# Patient Record
Sex: MALE | Race: WHITE | NOT HISPANIC OR LATINO | URBAN - METROPOLITAN AREA
[De-identification: names, ages, dates, MRNs, and addresses within clinical notes are randomized per-mention and may not be internally consistent; named-entity substitution may affect disease eponyms.]

---

## 2023-03-08 ENCOUNTER — TELEPHONE (OUTPATIENT)
Dept: PRIMARY CARE | Facility: CLINIC | Age: 68
End: 2023-03-08
Payer: MEDICARE

## 2023-03-08 NOTE — TELEPHONE ENCOUNTER
Pt left message saying he has COVID last week and was given paxlovid which he completed and it helped him. He was feeling better. But he started to not feel well again and He tested yesterday and it was positive. He has the same sx just they're not as intense. He would like to know what he should do if anything or if he could even do a virtual with you.     [facility] on file    Contact [phone]

## 2023-06-28 DIAGNOSIS — G47.00 INSOMNIA, UNSPECIFIED: ICD-10-CM

## 2023-06-28 RX ORDER — TRAZODONE HYDROCHLORIDE 100 MG/1
TABLET ORAL
Qty: 135 TABLET | Refills: 1 | Status: SHIPPED | OUTPATIENT
Start: 2023-06-28 | End: 2023-12-27

## 2023-07-06 LAB
ALANINE AMINOTRANSFERASE (SGPT) (U/L) IN SER/PLAS: 30 U/L (ref 10–52)
ANION GAP IN SER/PLAS: 13 MMOL/L (ref 10–20)
CALCIUM (MG/DL) IN SER/PLAS: 9.9 MG/DL (ref 8.6–10.6)
CARBON DIOXIDE, TOTAL (MMOL/L) IN SER/PLAS: 27 MMOL/L (ref 21–32)
CHLORIDE (MMOL/L) IN SER/PLAS: 102 MMOL/L (ref 98–107)
CHOLESTEROL (MG/DL) IN SER/PLAS: 168 MG/DL (ref 0–199)
CHOLESTEROL IN HDL (MG/DL) IN SER/PLAS: 58.6 MG/DL
CHOLESTEROL/HDL RATIO: 2.9
CREATININE (MG/DL) IN SER/PLAS: 0.81 MG/DL (ref 0.5–1.3)
ESTIMATED AVERAGE GLUCOSE FOR HBA1C: 111 MG/DL
GFR MALE: >90 ML/MIN/1.73M2
GLUCOSE (MG/DL) IN SER/PLAS: 97 MG/DL (ref 74–99)
HEMOGLOBIN A1C/HEMOGLOBIN TOTAL IN BLOOD: 5.5 %
LDL: 94 MG/DL (ref 0–99)
POTASSIUM (MMOL/L) IN SER/PLAS: 4.7 MMOL/L (ref 3.5–5.3)
SODIUM (MMOL/L) IN SER/PLAS: 137 MMOL/L (ref 136–145)
TRIGLYCERIDE (MG/DL) IN SER/PLAS: 76 MG/DL (ref 0–149)
UREA NITROGEN (MG/DL) IN SER/PLAS: 13 MG/DL (ref 6–23)
VLDL: 15 MG/DL (ref 0–40)

## 2023-07-08 DIAGNOSIS — E78.00 PURE HYPERCHOLESTEROLEMIA, UNSPECIFIED: ICD-10-CM

## 2023-07-10 RX ORDER — ATORVASTATIN CALCIUM 20 MG/1
TABLET, FILM COATED ORAL
Qty: 90 TABLET | Refills: 1 | Status: SHIPPED | OUTPATIENT
Start: 2023-07-10 | End: 2024-01-02

## 2023-07-17 PROBLEM — R73.09 HEMOGLOBIN A1C LESS THAN 7.0%: Status: ACTIVE | Noted: 2023-07-17

## 2023-07-17 PROBLEM — R73.01 ELEVATED FASTING GLUCOSE: Status: ACTIVE | Noted: 2023-07-17

## 2023-07-17 PROBLEM — E55.9 VITAMIN D DEFICIENCY: Status: ACTIVE | Noted: 2023-07-17
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
## 2023-07-17 PROBLEM — D12.6 TUBULOVILLOUS ADENOMA OF COLON: Status: ACTIVE | Noted: 2023-07-17

## 2023-07-17 PROBLEM — R93.1 AGATSTON CAC SCORE 100-199: Status: ACTIVE | Noted: 2023-07-17

## 2023-07-17 PROBLEM — R55 SYNCOPE: Status: ACTIVE | Noted: 2023-07-17

## 2023-07-17 PROBLEM — L98.9 SKIN LESION: Status: ACTIVE | Noted: 2023-07-17

## 2023-07-17 PROBLEM — F40.10 SHY BLADDER SYNDROME: Status: ACTIVE | Noted: 2023-07-17

## 2023-07-17 PROBLEM — E78.00 HYPERCHOLESTEROLEMIA: Status: ACTIVE | Noted: 2023-07-17

## 2023-07-17 PROBLEM — G47.00 INSOMNIA, UNSPECIFIED: Status: ACTIVE | Noted: 2023-07-17

## 2023-07-17 PROBLEM — K21.9 GERD (GASTROESOPHAGEAL REFLUX DISEASE): Status: ACTIVE | Noted: 2023-07-17

## 2023-07-17 PROBLEM — R09.81 NASAL CONGESTION: Status: ACTIVE | Noted: 2023-07-17

## 2023-07-17 PROBLEM — R35.1 NOCTURIA: Status: ACTIVE | Noted: 2023-07-17

## 2023-07-17 PROBLEM — R06.83 SNORING: Status: ACTIVE | Noted: 2023-07-17

## 2023-07-17 PROBLEM — T78.40XA ALLERGIC DRUG REACTION: Status: ACTIVE | Noted: 2023-07-17

## 2023-07-17 PROBLEM — R73.03 PREDIABETES: Status: ACTIVE | Noted: 2023-07-17

## 2023-07-17 PROBLEM — N40.0 BPH (BENIGN PROSTATIC HYPERPLASIA): Status: ACTIVE | Noted: 2023-07-17

## 2023-07-17 PROBLEM — R40.0 DAYTIME SOMNOLENCE: Status: ACTIVE | Noted: 2023-07-17

## 2023-07-17 RX ORDER — ACETAMINOPHEN 500 MG
TABLET ORAL
COMMUNITY
Start: 2014-08-18

## 2023-07-17 RX ORDER — TADALAFIL 20 MG/1
TABLET ORAL
COMMUNITY
Start: 2016-06-22

## 2023-07-17 RX ORDER — FINASTERIDE 5 MG/1
5 TABLET, FILM COATED ORAL DAILY
COMMUNITY

## 2023-07-17 RX ORDER — TAMSULOSIN HYDROCHLORIDE 0.4 MG/1
0.4 CAPSULE ORAL 2 TIMES DAILY
COMMUNITY

## 2023-07-18 ENCOUNTER — OFFICE VISIT (OUTPATIENT)
Dept: PRIMARY CARE | Facility: CLINIC | Age: 68
End: 2023-07-18
Payer: MEDICARE

## 2023-07-18 VITALS
SYSTOLIC BLOOD PRESSURE: 128 MMHG | WEIGHT: 185 LBS | BODY MASS INDEX: 27.4 KG/M2 | HEART RATE: 74 BPM | OXYGEN SATURATION: 95 % | HEIGHT: 69 IN | DIASTOLIC BLOOD PRESSURE: 72 MMHG

## 2023-07-18 DIAGNOSIS — G47.00 INSOMNIA, UNSPECIFIED TYPE: ICD-10-CM

## 2023-07-18 DIAGNOSIS — E78.00 HYPERCHOLESTEROLEMIA: Primary | ICD-10-CM

## 2023-07-18 DIAGNOSIS — R73.03 PREDIABETES: ICD-10-CM

## 2023-07-18 DIAGNOSIS — R35.1 BENIGN PROSTATIC HYPERPLASIA WITH NOCTURIA: ICD-10-CM

## 2023-07-18 DIAGNOSIS — N40.1 BENIGN PROSTATIC HYPERPLASIA WITH NOCTURIA: ICD-10-CM

## 2023-07-18 PROBLEM — R73.01 ELEVATED FASTING GLUCOSE: Status: RESOLVED | Noted: 2023-07-17 | Resolved: 2023-07-18

## 2023-07-18 PROBLEM — R73.09 HEMOGLOBIN A1C LESS THAN 7.0%: Status: RESOLVED | Noted: 2023-07-17 | Resolved: 2023-07-18
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
## 2023-07-18 PROCEDURE — 99214 OFFICE O/P EST MOD 30 MIN: CPT | Performed by: INTERNAL MEDICINE

## 2023-07-18 PROCEDURE — 1159F MED LIST DOCD IN RCRD: CPT | Performed by: INTERNAL MEDICINE

## 2023-07-18 PROCEDURE — 1160F RVW MEDS BY RX/DR IN RCRD: CPT | Performed by: INTERNAL MEDICINE

## 2023-07-18 RX ORDER — MULTIVITAMIN
1 TABLET ORAL DAILY
COMMUNITY

## 2023-07-18 RX ORDER — ASPIRIN 81 MG/1
81 TABLET ORAL DAILY
COMMUNITY

## 2023-07-18 ASSESSMENT — ENCOUNTER SYMPTOMS
DIZZINESS: 0
FATIGUE: 0
SHORTNESS OF BREATH: 0
HEADACHES: 0
MYALGIAS: 0
INSOMNIA: 1

## 2023-07-18 NOTE — PROGRESS NOTES
"Subjective   Patient ID: Kei Carlton is a 68 y.o. male who presents for Follow-up chronic medical problems.    Doing well.  Sleeping 6-7 hours with trazodone and melatonin.  Nocturia x 3.  Urology following, proscar and flomax.  Scheduled for colonoscopy this fall.  Has derm fu for skin survey.  Meds and labs reviewed.    Hyperlipidemia  This is a chronic problem. The current episode started more than 1 year ago. The problem is controlled. Recent lipid tests were reviewed and are low. Pertinent negatives include no chest pain, myalgias or shortness of breath. Current antihyperlipidemic treatment includes statins. The current treatment provides significant improvement of lipids. There are no compliance problems.    Insomnia  This is a chronic problem. The current episode started more than 1 year ago. The problem occurs rarely. The problem has been resolved. Pertinent negatives include no chest pain, fatigue, headaches or myalgias. The treatment provided significant relief.        Review of Systems   Constitutional:  Negative for fatigue.   Respiratory:  Negative for shortness of breath.    Cardiovascular:  Negative for chest pain.   Musculoskeletal:  Negative for myalgias.   Neurological:  Negative for dizziness and headaches.   Psychiatric/Behavioral:  The patient has insomnia.        Objective   /72 (BP Location: Right arm, Patient Position: Sitting)   Pulse 74   Ht 1.753 m (5' 9\")   Wt 83.9 kg (185 lb)   SpO2 95%   BMI 27.32 kg/m²     Physical Exam  Constitutional:       Appearance: Normal appearance.   Cardiovascular:      Rate and Rhythm: Normal rate and regular rhythm.      Pulses: Normal pulses.      Heart sounds: Normal heart sounds.   Pulmonary:      Effort: Pulmonary effort is normal.      Breath sounds: Normal breath sounds.   Abdominal:      General: Abdomen is flat. Bowel sounds are normal.      Palpations: Abdomen is soft.   Neurological:      General: No focal deficit present.      Mental " Status: He is alert.   Psychiatric:         Mood and Affect: Mood normal.         Behavior: Behavior normal.         Thought Content: Thought content normal.         Judgment: Judgment normal.       Assessment/Plan   Problem List Items Addressed This Visit          Cardiac and Vasculature    Hypercholesterolemia - Primary     Lipids, LDL at goal with statin.  Recommend low fat/cholesterol diet.           Relevant Orders    CBC and Auto Differential    Comprehensive metabolic panel    Lipid Panel    TSH with reflex to Free T4 if abnormal       Endocrine/Metabolic    Prediabetes     A1c normal with diet.         Relevant Orders    CBC and Auto Differential    Comprehensive metabolic panel    Hemoglobin A1C       Genitourinary and Reproductive    BPH (benign prostatic hyperplasia)     Urology following, on proscar and flomax.            Sleep    Insomnia, unspecified     Sleep improved with trazodone and melatonin.

## 2023-08-16 ASSESSMENT — ENCOUNTER SYMPTOMS
HEADACHES: 0
DYSURIA: 0
LEG PAIN: 0
TINGLING: 0
FEVER: 0
PARESTHESIAS: 0
BOWEL INCONTINENCE: 0
WEIGHT LOSS: 0
NUMBNESS: 0
PERIANAL NUMBNESS: 0
PARESIS: 0
WEAKNESS: 0
ABDOMINAL PAIN: 0
BACK PAIN: 1

## 2023-08-17 ENCOUNTER — OFFICE VISIT (OUTPATIENT)
Dept: PRIMARY CARE | Facility: CLINIC | Age: 68
End: 2023-08-17
Payer: MEDICARE

## 2023-08-17 VITALS
HEIGHT: 69 IN | HEART RATE: 74 BPM | DIASTOLIC BLOOD PRESSURE: 70 MMHG | SYSTOLIC BLOOD PRESSURE: 116 MMHG | OXYGEN SATURATION: 98 % | BODY MASS INDEX: 27.55 KG/M2 | WEIGHT: 186 LBS
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
## 2023-08-17 DIAGNOSIS — M53.3 COCCYX PAIN: Primary | ICD-10-CM

## 2023-08-17 PROBLEM — N40.1 HYPERTROPHY OF PROSTATE WITH URINARY OBSTRUCTION: Status: ACTIVE | Noted: 2017-10-31

## 2023-08-17 PROBLEM — N13.8 HYPERTROPHY OF PROSTATE WITH URINARY OBSTRUCTION: Status: ACTIVE | Noted: 2017-10-31

## 2023-08-17 PROBLEM — N52.9 ERECTILE DYSFUNCTION: Status: ACTIVE | Noted: 2017-10-31

## 2023-08-17 PROBLEM — N27.0 UNILATERAL SMALL KIDNEY: Status: ACTIVE | Noted: 2017-10-31

## 2023-08-17 PROCEDURE — 1160F RVW MEDS BY RX/DR IN RCRD: CPT | Performed by: NURSE PRACTITIONER

## 2023-08-17 PROCEDURE — 1159F MED LIST DOCD IN RCRD: CPT | Performed by: NURSE PRACTITIONER

## 2023-08-17 PROCEDURE — 1125F AMNT PAIN NOTED PAIN PRSNT: CPT | Performed by: NURSE PRACTITIONER

## 2023-08-17 PROCEDURE — 99212 OFFICE O/P EST SF 10 MIN: CPT | Performed by: NURSE PRACTITIONER

## 2023-08-17 ASSESSMENT — ENCOUNTER SYMPTOMS
PARESIS: 0
NUMBNESS: 0
DYSURIA: 0
BACK PAIN: 1
ABDOMINAL PAIN: 0
PARESTHESIAS: 0
WEAKNESS: 0
HEADACHES: 0
TINGLING: 0
PERIANAL NUMBNESS: 0
BOWEL INCONTINENCE: 0
BRUISES/BLEEDS EASILY: 0
RESPIRATORY NEGATIVE: 1
FEVER: 0
WEIGHT LOSS: 0
LEG PAIN: 0

## 2023-08-17 ASSESSMENT — PAIN SCALES - GENERAL: PAINLEVEL: 6

## 2023-08-17 NOTE — PROGRESS NOTES
Subjective   Patient ID: Kei Carlton is a 68 y.o. male who presents for Fall (Fell and hit tailbone. He was trimming up shrubs and fell backwards into a bush landing on his tail bone. /LOV 7/18/23/NOV 1/10/24).    Patient here for acute visit, Last seen by Dr. Hinton on 07/18/2023  Current Concerns:  1) Fell 2 weeks ago while trimming trees, standing on the ground- fell backwards and into a bush landing on his tail bone with the bigger branch hitting on the tail bone area.  Today reports pain is 6/10.  Using Advil 400 mg- relieves pain very well.  Initially 1-2 tabs every six hours. Reports has only one kidney and worried that taking Advil will be harmful. Denies any change in bowel or bladder. Hurts to sit, standing walking and lying down not painful. Heat or ice not helpful   Is planning on driving to [place] in few weeks- concerned about pain with sitting in car although thus far driving does not seem to be a problem.  Chronic concerns; BPH, GERD, Insomnia, Hypercholesterolemia, Prediabetes, ED, OA left hip    Back Pain  This is a new problem. The current episode started 1 to 4 weeks ago. The problem occurs constantly. The problem is unchanged. The pain is present in the gluteal. The quality of the pain is described as aching. The pain does not radiate. The pain is at a severity of 7/10. The pain is The same all the time. The symptoms are aggravated by sitting. Pertinent negatives include no abdominal pain, bladder incontinence, bowel incontinence, chest pain, dysuria, fever, headaches, leg pain, numbness, paresis, paresthesias, pelvic pain, perianal numbness, tingling, weakness or weight loss. Risk factors include recent trauma.        Review of Systems   Constitutional:  Negative for fever and weight loss.   Respiratory: Negative.     Cardiovascular:  Negative for chest pain.   Gastrointestinal:  Negative for abdominal pain and bowel incontinence.   Genitourinary:  Negative for bladder incontinence,  "dysuria and pelvic pain.   Musculoskeletal:  Positive for back pain.        No weakness in extremities.    Skin: Negative.    Neurological:  Negative for tingling, weakness, numbness, headaches and paresthesias.   Hematological:  Does not bruise/bleed easily.       Objective   /70 (BP Location: Left arm, Patient Position: Sitting, BP Cuff Size: Adult)   Pulse 74   Ht 1.753 m (5' 9\")   Wt 84.4 kg (186 lb)   SpO2 98%   BMI 27.47 kg/m²     Physical Exam  Vitals reviewed.   Constitutional:       Appearance: Normal appearance.   Cardiovascular:      Rate and Rhythm: Normal rate and regular rhythm.      Heart sounds: Normal heart sounds.   Pulmonary:      Effort: Pulmonary effort is normal.      Breath sounds: Normal breath sounds.   Musculoskeletal:         General: Normal range of motion.      Cervical back: Normal.      Thoracic back: Normal.      Lumbar back: Normal.        Back:       Comments: Area of pain to palpation- no swelling or bruising noted.    Skin:     General: Skin is warm.   Neurological:      Mental Status: He is alert.   Psychiatric:         Mood and Affect: Mood normal.         Behavior: Behavior normal.         Judgment: Judgment normal.       Assessment/Plan   Diagnoses and all orders for this visit:  Coccyx pain / Fall, initial encounter- continue on Advil only as absolutely needed. Declined prednisone. Conservative treatment with heat ice and pillow -donut shape for comfort.    -     XR sacrum coccyx 2+ views; Future  PLAN: follow up as scheduled with Dr. Hinton   X-ray-> communicate results.    "

## 2023-08-17 NOTE — PATIENT INSTRUCTIONS
Use pillow or donut shaped pillow while sitting  X-ray ordered, will communicate results through My Chart  Will communicate Dr. Mary Jane Schneider vaccine recommendations through My Chart.

## 2023-08-17 NOTE — PROGRESS NOTES
Answers submitted by the patient for this visit:  Back Pain Questionnaire (Submitted on 8/16/2023)  Chief Complaint: Back pain  Chronicity: new  Onset: 1 to 4 weeks ago  Frequency: constantly  Progression since onset: unchanged  Pain location: gluteal  Pain quality: aching  Radiates to: does not radiate  Pain - numeric: 7/10  Pain is: the same all the time  Aggravated by: sitting  abdominal pain: No  bladder incontinence: No  bowel incontinence: No  chest pain: No  dysuria: No  fever: No  headaches: No  leg pain: No  numbness: No  paresis: No  paresthesias: No  pelvic pain: No  perianal numbness: No  tingling: No  weakness: No  weight loss: No  Risk factors: recent trauma     Dupixent Counseling: I discussed with the patient the risks of dupilumab including but not limited to eye infection and irritation, cold sores, injection site reactions, worsening of asthma, allergic reactions and increased risk of parasitic infection.  Live vaccines should be avoided while taking dupilumab. Dupilumab will also interact with certain medications such as warfarin and cyclosporine. The patient understands that monitoring is required and they must alert us or the primary physician if symptoms of infection or other concerning signs are noted.

## 2023-10-03 ENCOUNTER — OFFICE VISIT (OUTPATIENT)
Dept: DERMATOLOGY | Facility: CLINIC | Age: 68
End: 2023-10-03
Payer: MEDICARE

## 2023-10-03 DIAGNOSIS — Z12.83 SKIN CANCER SCREENING: ICD-10-CM

## 2023-10-03 DIAGNOSIS — L81.4 LENTIGO: ICD-10-CM

## 2023-10-03 DIAGNOSIS — D18.01 HEMANGIOMA OF SKIN: ICD-10-CM

## 2023-10-03 DIAGNOSIS — D22.9 NEVUS: Primary | ICD-10-CM

## 2023-10-03 DIAGNOSIS — L82.1 SEBORRHEIC KERATOSIS: ICD-10-CM

## 2023-10-03 DIAGNOSIS — L73.8 SEBACEOUS HYPERPLASIA OF FACE: ICD-10-CM

## 2023-10-03 PROCEDURE — 99213 OFFICE O/P EST LOW 20 MIN: CPT | Performed by: NURSE PRACTITIONER

## 2023-10-03 PROCEDURE — 1159F MED LIST DOCD IN RCRD: CPT | Performed by: NURSE PRACTITIONER

## 2023-10-03 PROCEDURE — 1160F RVW MEDS BY RX/DR IN RCRD: CPT | Performed by: NURSE PRACTITIONER

## 2023-10-03 PROCEDURE — 1125F AMNT PAIN NOTED PAIN PRSNT: CPT | Performed by: NURSE PRACTITIONER

## 2023-10-03 ASSESSMENT — DERMATOLOGY QUALITY OF LIFE (QOL) ASSESSMENT
WHAT SINGLE SKIN CONDITION LISTED BELOW IS THE PATIENT ANSWERING THE QUALITY-OF-LIFE ASSESSMENT QUESTIONS ABOUT: NONE OF THE ABOVE
RATE HOW EMOTIONALLY BOTHERED YOU ARE BY YOUR SKIN PROBLEM (FOR EXAMPLE, WORRY, EMBARRASSMENT, FRUSTRATION): 0 - NEVER BOTHERED
RATE HOW BOTHERED YOU ARE BY SYMPTOMS OF YOUR SKIN PROBLEM (EG, ITCHING, STINGING BURNING, HURTING OR SKIN IRRITATION): 0 - NEVER BOTHERED
RATE HOW BOTHERED YOU ARE BY EFFECTS OF YOUR SKIN PROBLEMS ON YOUR ACTIVITIES (EG, GOING OUT, ACCOMPLISHING WHAT YOU WANT, WORK ACTIVITIES OR YOUR RELATIONSHIPS WITH OTHERS): 0 - NEVER BOTHERED
ARE THERE EXCLUSIONS OR EXCEPTIONS FOR THE QUALITY OF LIFE ASSESSMENT: NO
DATE THE QUALITY-OF-LIFE ASSESSMENT WAS COMPLETED: 66750

## 2023-10-03 ASSESSMENT — DERMATOLOGY PATIENT ASSESSMENT
HAVE YOU HAD OR DO YOU HAVE VASCULAR DISEASE: NO
DO YOU USE A TANNING BED: NO
DO YOU HAVE ANY NEW OR CHANGING LESIONS: NO
ARE YOU AN ORGAN TRANSPLANT RECIPIENT: NO
HAVE YOU HAD OR DO YOU HAVE A STAPH INFECTION: NO
FOR PATIENTS COMING IN FOR A FOLLOW-UP VISIT - HAVE THERE BEEN ANY CHANGES IN YOUR HEALTH SINCE YOUR LAST VISIT: NONE.

## 2023-10-03 ASSESSMENT — PATIENT GLOBAL ASSESSMENT (PGA): PATIENT GLOBAL ASSESSMENT: PATIENT GLOBAL ASSESSMENT:  1 - CLEAR

## 2023-10-03 ASSESSMENT — ITCH NUMERIC RATING SCALE: HOW SEVERE IS YOUR ITCHING?: 0

## 2023-10-03 NOTE — PROGRESS NOTES
Subjective     Kei Carlton is a 68 y.o. male who presents for the following: Skin Check (Patient presents to office today for FBSE. PMHX insignificant. Denies lesions that are painful, itching or bleeding. Denies further complaints. ).     Review of Systems:  No other skin or systemic complaints other than what is documented elsewhere in the note.    The following portions of the chart were reviewed this encounter and updated as appropriate:          Skin Cancer History  No skin cancer on file.      Specialty Problems          Dermatology Problems    Skin lesion        Objective   Well appearing patient in no apparent distress; mood and affect are within normal limits.    A focused skin examination was performed. All findings within normal limits unless otherwise noted below.    Assessment/Plan   1. Nevus    Related Procedures  Follow Up In Dermatology - Established Patient    2. Skin cancer screening    3. Hemangioma of skin  Scattered cherry-red papule(s).    - A cherry hemangioma is a small macule (small, flat, smooth area) or papule (small, solid bump) formed from an overgrowth of tiny blood vessels in the skin. Cherry hemangiomas are characteristically red or purplish in color. They often first appear in middle adulthood and usually increase in number with age. Cherry hemangiomas are noncancerous (benign) and are common in adults.  - Try to avoid trauma to the lesion(s), which may cause irritation and bleeding of the area.  - If cosmetically bothersome or if they cause irritation or bleeding, cherry hemangiomas may be removed by cutting away the area (excision), burning away the area (electrocautery), freezing the area (cryosurgery), or via laser therapy.      4. Lentigo  Scattered tan macules in sun-exposed areas.    - A solar lentigo (plural, solar lentigines), also known as a sun-induced freckle or senile lentigo, is a dark (hyperpigmented) lesion caused by natural or artificial ultraviolet (UV) light.  Solar lentigines may be single or multiple.   - Solar lentigines typically appear on areas exposed to natural or artificial UV light. They appear as well-defined, light brown to black, flat spots.   - Solar lentigines are benign, but they do indicate excessive sun exposure, a risk factor for the development of skin cancer.      5. Sebaceous hyperplasia of face  Small yellow, lobulated papules with a central dell.    Sebaceous hyperplasia  - This is a common harmless enlargement of the skin oil glands.  - No treatment is required. Unless cosmetically removed, these lesions will not resolve.       6. Seborrheic keratosis  Stuck on verrucous, tan-brown papules and plaques.      Although Seborrheic Keratoses can be troublesome and unsightly, they are entirely benign.  Removal of Seborrheic Keratoses is considered a cosmetic procedure. Removal is typically performed using liquid nitrogen cryotherapy.  Treatment of current lesions does not prevent the development of new Seborrheic Keratoses in the future.

## 2023-12-23 DIAGNOSIS — G47.00 INSOMNIA, UNSPECIFIED: ICD-10-CM

## 2023-12-27 RX ORDER — TRAZODONE HYDROCHLORIDE 100 MG/1
TABLET ORAL
Qty: 135 TABLET | Refills: 1 | Status: SHIPPED | OUTPATIENT
Start: 2023-12-27

## 2023-12-31 DIAGNOSIS — E78.00 PURE HYPERCHOLESTEROLEMIA, UNSPECIFIED: ICD-10-CM

## 2024-01-01 NOTE — PROGRESS NOTES
Subjective   HPI: Kei Carlton is a 68 y.o. male is a new patient here for evaluation and treatment multiple 1 mm hyperkeratotic papules on the sole of the right foot that have been present for about 3 weeks.  They are not pruritic however they are painful if he puts his weight on these areas of his foot.  He has not had anything like this in the past.  He has not been anywhere barefoot.  Patient also mentions that he has a small red papule on his penis that is erythematous.  He states that he mention this to his urologist last visit she recommended Neosporin to be put on this area.  The area does not itch or hurt it is just simply red and there.    ROS: No other skin or systemic complaints other than what is documented elsewhere in the note.    ALLERGIES: Clindamycin    SOCIAL:  reports that he has been smoking cigars. He has never used smokeless tobacco. No history on file for alcohol use and drug use.    Objective   Right Hallux Metatarsal Plantar Area, Right Medial Plantar Surface  Multiple 1 mm yellow hyperkeratotic papules on weight baring area of right foot      Patient declines genital examination at this time.    Assessment/Plan   1. Punctate palmoplantar keratoderma (2)  Right Hallux Metatarsal Plantar Area; Right Medial Plantar Surface    Start:  Amlactin every day  Augmented betamethasone every day     Discussed benign nature of punctate keratosis with patient.  I brought in Dr. Sears during this visit and he recommended doing a shave removal to get a diagnosis and then recommended AmLactin and augmented betamethasone cream.    I would like to see patient back in 1 week to discuss biopsy results.    Shave removal - Right Hallux Metatarsal Plantar Area    Specimen 1 - Dermatopathology- DERM LAB  Differential Diagnosis: punctate hyperkeratosis vs cutaneous corn  Check Margins Yes/No?:    Comments:    Dermpath Lab: Routine Histopathology (formalin-fixed tissue)    Related Procedures  Follow Up In  Dermatology - Established Patient    Related Medications  betamethasone, augmented, (Diprolene AF) 0.05 % cream  Apply topically once daily. Apply a thin layer to affected area once daily. DO NOT USE ON FACE OR GROIN.    ammonium lactate (Amlactin) 12 % cream  Apply topically daily to dry skin.         I recommended that patient apply Polysporin or bacitracin to the area on his penis for the next 5 days.  If this does not improve then at next visit I recommended I take a look.  Patient agrees with this plan.    FOLLOW UP: 1 week-biopsy results and groin region discussion    The patient was encouraged to contact me with any further questions or concerns.  Genoveva Faust PA-C  1/18/2024

## 2024-01-02 ENCOUNTER — LAB (OUTPATIENT)
Dept: LAB | Facility: LAB | Age: 69
End: 2024-01-02
Payer: MEDICARE

## 2024-01-02 DIAGNOSIS — R73.03 PREDIABETES: ICD-10-CM

## 2024-01-02 DIAGNOSIS — E78.00 HYPERCHOLESTEROLEMIA: ICD-10-CM

## 2024-01-02 LAB
ALBUMIN SERPL BCP-MCNC: 4.6 G/DL (ref 3.4–5)
ALP SERPL-CCNC: 71 U/L (ref 33–136)
ALT SERPL W P-5'-P-CCNC: 29 U/L (ref 10–52)
ANION GAP SERPL CALC-SCNC: 12 MMOL/L (ref 10–20)
AST SERPL W P-5'-P-CCNC: 21 U/L (ref 9–39)
BASOPHILS # BLD AUTO: 0.05 X10*3/UL (ref 0–0.1)
BASOPHILS NFR BLD AUTO: 0.7 %
BILIRUB SERPL-MCNC: 0.7 MG/DL (ref 0–1.2)
BUN SERPL-MCNC: 12 MG/DL (ref 6–23)
CALCIUM SERPL-MCNC: 9.6 MG/DL (ref 8.6–10.6)
CHLORIDE SERPL-SCNC: 103 MMOL/L (ref 98–107)
CHOLEST SERPL-MCNC: 161 MG/DL (ref 0–199)
CHOLESTEROL/HDL RATIO: 3.1
CO2 SERPL-SCNC: 27 MMOL/L (ref 21–32)
CREAT SERPL-MCNC: 0.86 MG/DL (ref 0.5–1.3)
EOSINOPHIL # BLD AUTO: 0.14 X10*3/UL (ref 0–0.7)
EOSINOPHIL NFR BLD AUTO: 2 %
ERYTHROCYTE [DISTWIDTH] IN BLOOD BY AUTOMATED COUNT: 12.8 % (ref 11.5–14.5)
EST. AVERAGE GLUCOSE BLD GHB EST-MCNC: 114 MG/DL
GFR SERPL CREATININE-BSD FRML MDRD: >90 ML/MIN/1.73M*2
GLUCOSE SERPL-MCNC: 99 MG/DL (ref 74–99)
HBA1C MFR BLD: 5.6 %
HCT VFR BLD AUTO: 47.2 % (ref 41–52)
HDLC SERPL-MCNC: 52.5 MG/DL
HGB BLD-MCNC: 15.2 G/DL (ref 13.5–17.5)
IMM GRANULOCYTES # BLD AUTO: 0.03 X10*3/UL (ref 0–0.7)
IMM GRANULOCYTES NFR BLD AUTO: 0.4 % (ref 0–0.9)
LDLC SERPL CALC-MCNC: 90 MG/DL
LYMPHOCYTES # BLD AUTO: 1.5 X10*3/UL (ref 1.2–4.8)
LYMPHOCYTES NFR BLD AUTO: 21.8 %
MCH RBC QN AUTO: 31.2 PG (ref 26–34)
MCHC RBC AUTO-ENTMCNC: 32.2 G/DL (ref 32–36)
MCV RBC AUTO: 97 FL (ref 80–100)
MONOCYTES # BLD AUTO: 0.52 X10*3/UL (ref 0.1–1)
MONOCYTES NFR BLD AUTO: 7.6 %
NEUTROPHILS # BLD AUTO: 4.64 X10*3/UL (ref 1.2–7.7)
NEUTROPHILS NFR BLD AUTO: 67.5 %
NON HDL CHOLESTEROL: 109 MG/DL (ref 0–149)
NRBC BLD-RTO: 0 /100 WBCS (ref 0–0)
PLATELET # BLD AUTO: 206 X10*3/UL (ref 150–450)
POTASSIUM SERPL-SCNC: 4.3 MMOL/L (ref 3.5–5.3)
PROT SERPL-MCNC: 6.8 G/DL (ref 6.4–8.2)
RBC # BLD AUTO: 4.87 X10*6/UL (ref 4.5–5.9)
SODIUM SERPL-SCNC: 138 MMOL/L (ref 136–145)
TRIGL SERPL-MCNC: 95 MG/DL (ref 0–149)
TSH SERPL-ACNC: 1.23 MIU/L (ref 0.44–3.98)
VLDL: 19 MG/DL (ref 0–40)
WBC # BLD AUTO: 6.9 X10*3/UL (ref 4.4–11.3)

## 2024-01-02 PROCEDURE — 80061 LIPID PANEL: CPT

## 2024-01-02 PROCEDURE — 85025 COMPLETE CBC W/AUTO DIFF WBC: CPT

## 2024-01-02 PROCEDURE — 83036 HEMOGLOBIN GLYCOSYLATED A1C: CPT

## 2024-01-02 PROCEDURE — 80053 COMPREHEN METABOLIC PANEL: CPT

## 2024-01-02 PROCEDURE — 84443 ASSAY THYROID STIM HORMONE: CPT

## 2024-01-02 PROCEDURE — 36415 COLL VENOUS BLD VENIPUNCTURE: CPT

## 2024-01-02 RX ORDER — ATORVASTATIN CALCIUM 20 MG/1
TABLET, FILM COATED ORAL
Qty: 90 TABLET | Refills: 3 | Status: SHIPPED | OUTPATIENT
Start: 2024-01-02

## 2024-01-10 ENCOUNTER — OFFICE VISIT (OUTPATIENT)
Dept: PRIMARY CARE | Facility: CLINIC | Age: 69
End: 2024-01-10
Payer: MEDICARE

## 2024-01-10 VITALS
WEIGHT: 186 LBS | HEART RATE: 90 BPM | OXYGEN SATURATION: 95 % | DIASTOLIC BLOOD PRESSURE: 72 MMHG | SYSTOLIC BLOOD PRESSURE: 124 MMHG | BODY MASS INDEX: 27.55 KG/M2 | HEIGHT: 69 IN

## 2024-01-10 DIAGNOSIS — G47.00 INSOMNIA, UNSPECIFIED TYPE: ICD-10-CM

## 2024-01-10 DIAGNOSIS — Z00.00 ROUTINE GENERAL MEDICAL EXAMINATION AT HEALTH CARE FACILITY: Primary | ICD-10-CM

## 2024-01-10 DIAGNOSIS — R73.03 PREDIABETES: ICD-10-CM

## 2024-01-10 DIAGNOSIS — E78.00 HYPERCHOLESTEROLEMIA: ICD-10-CM

## 2024-01-10 DIAGNOSIS — F40.10 SHY BLADDER SYNDROME: ICD-10-CM

## 2024-01-10 PROCEDURE — G0439 PPPS, SUBSEQ VISIT: HCPCS | Performed by: INTERNAL MEDICINE

## 2024-01-10 PROCEDURE — 1160F RVW MEDS BY RX/DR IN RCRD: CPT | Performed by: INTERNAL MEDICINE

## 2024-01-10 PROCEDURE — 1170F FXNL STATUS ASSESSED: CPT | Performed by: INTERNAL MEDICINE

## 2024-01-10 PROCEDURE — 1125F AMNT PAIN NOTED PAIN PRSNT: CPT | Performed by: INTERNAL MEDICINE

## 2024-01-10 PROCEDURE — 99214 OFFICE O/P EST MOD 30 MIN: CPT | Performed by: INTERNAL MEDICINE

## 2024-01-10 PROCEDURE — 1159F MED LIST DOCD IN RCRD: CPT | Performed by: INTERNAL MEDICINE

## 2024-01-10 RX ORDER — SERTRALINE HYDROCHLORIDE 25 MG/1
25 TABLET, FILM COATED ORAL DAILY
COMMUNITY

## 2024-01-10 RX ORDER — HYDROXYZINE HYDROCHLORIDE 10 MG/1
TABLET, FILM COATED ORAL
COMMUNITY

## 2024-01-10 ASSESSMENT — ACTIVITIES OF DAILY LIVING (ADL)
GROCERY_SHOPPING: INDEPENDENT
BATHING: INDEPENDENT
DOING_HOUSEWORK: INDEPENDENT
MANAGING_FINANCES: INDEPENDENT
DRESSING: INDEPENDENT
TAKING_MEDICATION: INDEPENDENT

## 2024-01-10 ASSESSMENT — ENCOUNTER SYMPTOMS
MYALGIAS: 0
SHORTNESS OF BREATH: 0
FATIGUE: 0
ABDOMINAL PAIN: 0
INSOMNIA: 1
DIZZINESS: 0

## 2024-01-10 ASSESSMENT — PATIENT HEALTH QUESTIONNAIRE - PHQ9
2. FEELING DOWN, DEPRESSED OR HOPELESS: NOT AT ALL
1. LITTLE INTEREST OR PLEASURE IN DOING THINGS: NOT AT ALL
SUM OF ALL RESPONSES TO PHQ9 QUESTIONS 1 AND 2: 0

## 2024-01-10 NOTE — ASSESSMENT & PLAN NOTE
Placed on sertraline and hydroxyzine for symptoms.  Stopped hydroxyzine due to side effects.  Followed with psych NP.

## 2024-01-10 NOTE — PROGRESS NOTES
Subjective   Reason for Visit: Kei Carlton is an 68 y.o. male here for a Medicare Wellness visit and chronic medical conditions.    Past Medical, Surgical, and Family History reviewed and updated in chart.    Reviewed all medications by prescribing practitioner or clinical pharmacist (such as prescriptions, OTCs, herbal therapies and supplements) and documented in the medical record.    Now taking sertraline for shy bladder.  Sleeping well with trazodone.  Usually 6-7 hours.  Healthy diet.  Cardio 30 minutes, 5-6 days a week.  Weight aron, BMI 27.  Meds and labs reviewed.    Insomnia  This is a chronic problem. The current episode started more than 1 year ago. The problem occurs rarely. The problem has been resolved. Associated symptoms include a rash. Pertinent negatives include no abdominal pain, chest pain, fatigue or myalgias. The treatment provided significant relief.   Hyperlipidemia  This is a chronic problem. The current episode started more than 1 year ago. The problem is controlled. Recent lipid tests were reviewed and are low. There are no known factors aggravating his hyperlipidemia. Pertinent negatives include no chest pain, myalgias or shortness of breath. Current antihyperlipidemic treatment includes statins. The current treatment provides significant improvement of lipids. There are no compliance problems.        Patient Care Team:  Perico Hinton MD as PCP - General  [facility] H[facility]     Review of Systems   Constitutional:  Negative for fatigue.   Respiratory:  Negative for shortness of breath.    Cardiovascular:  Negative for chest pain.   Gastrointestinal:  Negative for abdominal pain.   Musculoskeletal:  Negative for myalgias.   Skin:  Positive for rash.   Neurological:  Negative for dizziness.   Psychiatric/Behavioral:  The patient has insomnia.        Objective   Vitals:  /72 (BP Location: Right arm, Patient Position: Sitting)   Pulse 90   " Ht 1.753 m (5' 9\")   Wt 84.4 kg (186 lb)   SpO2 95%   BMI 27.47 kg/m²       Physical Exam  Constitutional:       Appearance: Normal appearance.   Neck:      Vascular: No carotid bruit.   Cardiovascular:      Rate and Rhythm: Normal rate and regular rhythm.      Pulses: Normal pulses.      Heart sounds: Normal heart sounds.   Pulmonary:      Effort: Pulmonary effort is normal.      Breath sounds: Normal breath sounds.   Abdominal:      General: Abdomen is flat. Bowel sounds are normal.      Palpations: Abdomen is soft.   Neurological:      General: No focal deficit present.      Mental Status: He is alert.   Psychiatric:         Mood and Affect: Mood normal.         Behavior: Behavior normal.         Thought Content: Thought content normal.         Judgment: Judgment normal.       Assessment/Plan   Problem List Items Addressed This Visit       Insomnia, unspecified    Current Assessment & Plan     Condition aron with trazodone.  Ok to decrease med to 100 mg as tolerated.         Hypercholesterolemia    Current Assessment & Plan     Lipids, LDL at goal with statin.  Recommend low fat/cholesterol diet.           Relevant Orders    Lipid Panel    ALT    Prediabetes    Current Assessment & Plan     A1c normal with diet.         Relevant Orders    Hemoglobin A1C    Basic metabolic panel    Shy bladder syndrome    Current Assessment & Plan     Placed on sertraline and hydroxyzine for symptoms.  Stopped hydroxyzine due to side effects.  Followed with psych NP.         Routine general medical examination at health care facility - Primary    Relevant Orders    1 Year Follow Up In Primary Care - Wellness Exam            "

## 2024-01-18 ENCOUNTER — OFFICE VISIT (OUTPATIENT)
Dept: DERMATOLOGY | Facility: CLINIC | Age: 69
End: 2024-01-18
Payer: MEDICARE

## 2024-01-18 DIAGNOSIS — L85.2 PUNCTATE PALMOPLANTAR KERATODERMA: Primary | ICD-10-CM

## 2024-01-18 PROCEDURE — 1160F RVW MEDS BY RX/DR IN RCRD: CPT

## 2024-01-18 PROCEDURE — 11305 SHAVE SKIN LESION 0.5 CM/<: CPT

## 2024-01-18 PROCEDURE — 1159F MED LIST DOCD IN RCRD: CPT

## 2024-01-18 PROCEDURE — 88305 TISSUE EXAM BY PATHOLOGIST: CPT | Performed by: DERMATOLOGY

## 2024-01-18 PROCEDURE — 1125F AMNT PAIN NOTED PAIN PRSNT: CPT

## 2024-01-18 PROCEDURE — 99203 OFFICE O/P NEW LOW 30 MIN: CPT

## 2024-01-18 RX ORDER — AMMONIUM LACTATE 12 G/100G
CREAM TOPICAL
Qty: 140 G | Refills: 1 | Status: SHIPPED | OUTPATIENT
Start: 2024-01-18

## 2024-01-18 RX ORDER — BETAMETHASONE DIPROPIONATE 0.5 MG/G
CREAM TOPICAL DAILY
Qty: 50 G | Refills: 0 | Status: SHIPPED | OUTPATIENT
Start: 2024-01-18 | End: 2024-02-17

## 2024-01-25 ENCOUNTER — OFFICE VISIT (OUTPATIENT)
Dept: DERMATOLOGY | Facility: CLINIC | Age: 69
End: 2024-01-25
Payer: MEDICARE

## 2024-01-25 DIAGNOSIS — R23.4 PARAKERATOSIS: Primary | ICD-10-CM

## 2024-01-25 LAB
LABORATORY COMMENT REPORT: NORMAL
PATH REPORT.FINAL DX SPEC: NORMAL
PATH REPORT.GROSS SPEC: NORMAL
PATH REPORT.MICROSCOPIC SPEC OTHER STN: NORMAL
PATH REPORT.RELEVANT HX SPEC: NORMAL
PATH REPORT.TOTAL CANCER: NORMAL

## 2024-01-25 PROCEDURE — 1160F RVW MEDS BY RX/DR IN RCRD: CPT

## 2024-01-25 PROCEDURE — 99212 OFFICE O/P EST SF 10 MIN: CPT

## 2024-01-25 PROCEDURE — 1125F AMNT PAIN NOTED PAIN PRSNT: CPT

## 2024-01-25 PROCEDURE — 1159F MED LIST DOCD IN RCRD: CPT

## 2024-01-28 NOTE — PROGRESS NOTES
Subjective   HPI: Kei Carlton is a 68 y.o. male is here for bx results. The amlactin and betamethasone have significantly helped the bumps on the foot - they are almost gone.    ROS: No other skin or systemic complaints other than what is documented elsewhere in the note.    ALLERGIES: Clindamycin    SOCIAL:  reports that he has been smoking cigars. He has never used smokeless tobacco. No history on file for alcohol use and drug use.    Specialty Problems          Dermatology Problems    Skin lesion       Objective   Right Hallux Metatarsal Plantar Area  [id number]        Assessment/Plan   1. Parakeratosis  Right Hallux Metatarsal Plantar Area    Discussed bx results showing parakeratosis. The amlactin and betamethasone are working. Use the betamethasone until sxs resolve and then one week after. Use amlactin every day prn dryness.         FOLLOW UP: prn    The patient was encouraged to contact me with any further questions or concerns.  Genoveva Faust PA-C  1/28/2024

## 2024-06-20 DIAGNOSIS — G47.00 INSOMNIA, UNSPECIFIED: ICD-10-CM

## 2024-06-20 RX ORDER — TRAZODONE HYDROCHLORIDE 100 MG/1
TABLET ORAL
Qty: 135 TABLET | Refills: 1 | Status: SHIPPED | OUTPATIENT
Start: 2024-06-20

## 2024-07-01 ENCOUNTER — TELEPHONE (OUTPATIENT)
Dept: PRIMARY CARE | Facility: CLINIC | Age: 69
End: 2024-07-01
Payer: MEDICARE
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
## 2024-07-01 DIAGNOSIS — S80.869A TICK BITE OF LOWER LEG, UNSPECIFIED LATERALITY, INITIAL ENCOUNTER: Primary | ICD-10-CM

## 2024-07-01 RX ORDER — DOXYCYCLINE 100 MG/1
200 CAPSULE ORAL ONCE
Qty: 2 CAPSULE | Refills: 0 | Status: SHIPPED | OUTPATIENT
Start: 2024-07-01 | End: 2024-07-01

## 2024-07-01 NOTE — TELEPHONE ENCOUNTER
Pt just pulled a tick out of left lower calf, the spot did bleed,  [place] on lower left calf , pet[place]a he picked up tick today but not sure, no fever, looking to be advised . RB pt

## 2024-07-08 ENCOUNTER — APPOINTMENT (OUTPATIENT)
Dept: PRIMARY CARE | Facility: CLINIC | Age: 69
End: 2024-07-08
Payer: MEDICARE

## 2024-07-09 ENCOUNTER — LAB (OUTPATIENT)
Dept: LAB | Facility: LAB | Age: 69
End: 2024-07-09
Payer: MEDICARE

## 2024-07-09 DIAGNOSIS — E78.00 HYPERCHOLESTEROLEMIA: ICD-10-CM

## 2024-07-09 DIAGNOSIS — R73.03 PREDIABETES: ICD-10-CM

## 2024-07-09 LAB
ALT SERPL W P-5'-P-CCNC: 47 U/L (ref 10–52)
ANION GAP SERPL CALC-SCNC: 14 MMOL/L (ref 10–20)
BUN SERPL-MCNC: 15 MG/DL (ref 6–23)
CALCIUM SERPL-MCNC: 9.5 MG/DL (ref 8.6–10.6)
CHLORIDE SERPL-SCNC: 102 MMOL/L (ref 98–107)
CHOLEST SERPL-MCNC: 172 MG/DL (ref 0–199)
CHOLESTEROL/HDL RATIO: 3.3
CO2 SERPL-SCNC: 26 MMOL/L (ref 21–32)
CREAT SERPL-MCNC: 0.79 MG/DL (ref 0.5–1.3)
EGFRCR SERPLBLD CKD-EPI 2021: >90 ML/MIN/1.73M*2
EST. AVERAGE GLUCOSE BLD GHB EST-MCNC: 120 MG/DL
GLUCOSE SERPL-MCNC: 103 MG/DL (ref 74–99)
HBA1C MFR BLD: 5.8 %
HDLC SERPL-MCNC: 52.2 MG/DL
LDLC SERPL CALC-MCNC: 102 MG/DL
NON HDL CHOLESTEROL: 120 MG/DL (ref 0–149)
POTASSIUM SERPL-SCNC: 4.5 MMOL/L (ref 3.5–5.3)
SODIUM SERPL-SCNC: 137 MMOL/L (ref 136–145)
TRIGL SERPL-MCNC: 87 MG/DL (ref 0–149)
VLDL: 17 MG/DL (ref 0–40)

## 2024-07-09 PROCEDURE — 83036 HEMOGLOBIN GLYCOSYLATED A1C: CPT

## 2024-07-09 PROCEDURE — 80048 BASIC METABOLIC PNL TOTAL CA: CPT

## 2024-07-09 PROCEDURE — 80061 LIPID PANEL: CPT

## 2024-07-09 PROCEDURE — 36415 COLL VENOUS BLD VENIPUNCTURE: CPT

## 2024-07-09 PROCEDURE — 84460 ALANINE AMINO (ALT) (SGPT): CPT

## 2024-07-15 ENCOUNTER — APPOINTMENT (OUTPATIENT)
Dept: PRIMARY CARE | Facility: CLINIC | Age: 69
End: 2024-07-15
Payer: MEDICARE

## 2024-07-15 VITALS
DIASTOLIC BLOOD PRESSURE: 68 MMHG | SYSTOLIC BLOOD PRESSURE: 124 MMHG | WEIGHT: 183.6 LBS | BODY MASS INDEX: 27.19 KG/M2 | HEART RATE: 73 BPM | HEIGHT: 69 IN | OXYGEN SATURATION: 93 %

## 2024-07-15 DIAGNOSIS — Z80.0 FAMILY HISTORY OF PANCREATIC CANCER: ICD-10-CM

## 2024-07-15 DIAGNOSIS — E78.00 HYPERCHOLESTEROLEMIA: Primary | ICD-10-CM

## 2024-07-15 DIAGNOSIS — R40.0 DAYTIME SOMNOLENCE: ICD-10-CM

## 2024-07-15 DIAGNOSIS — F40.10 SHY BLADDER SYNDROME: ICD-10-CM

## 2024-07-15 DIAGNOSIS — M21.41 FLAT FEET, BILATERAL: ICD-10-CM

## 2024-07-15 DIAGNOSIS — R73.03 PREDIABETES: ICD-10-CM

## 2024-07-15 DIAGNOSIS — R74.8 ELEVATED LIVER ENZYMES: ICD-10-CM

## 2024-07-15 DIAGNOSIS — M21.42 FLAT FEET, BILATERAL: ICD-10-CM

## 2024-07-15 DIAGNOSIS — R06.83 SNORING: ICD-10-CM

## 2024-07-15 DIAGNOSIS — G47.00 INSOMNIA, UNSPECIFIED TYPE: ICD-10-CM

## 2024-07-15 DIAGNOSIS — M79.89 SOFT TISSUE MASS: ICD-10-CM

## 2024-07-15 PROBLEM — R53.83 FATIGUE: Status: ACTIVE | Noted: 2024-07-15

## 2024-07-15 PROBLEM — L82.1 OTHER SEBORRHEIC KERATOSIS: Status: ACTIVE | Noted: 2019-01-08

## 2024-07-15 PROBLEM — R55 SYNCOPE: Status: RESOLVED | Noted: 2023-07-17 | Resolved: 2024-07-15

## 2024-07-15 PROBLEM — L57.8 OTHER SKIN CHANGES DUE TO CHRONIC EXPOSURE TO NONIONIZING RADIATION: Status: ACTIVE | Noted: 2021-08-27

## 2024-07-15 PROBLEM — L57.0 ACTINIC KERATOSIS: Status: ACTIVE | Noted: 2019-01-08

## 2024-07-15 PROBLEM — U07.1 DISEASE DUE TO SEVERE ACUTE RESPIRATORY SYNDROME CORONAVIRUS 2 (SARS-COV-2): Status: ACTIVE | Noted: 2020-12-06

## 2024-07-15 PROBLEM — M79.604 PAIN OF RIGHT LOWER EXTREMITY: Status: ACTIVE | Noted: 2024-07-15

## 2024-07-15 PROBLEM — L82.0 INFLAMED SEBORRHEIC KERATOSIS: Status: ACTIVE | Noted: 2020-12-06

## 2024-07-15 PROBLEM — D22.5 MELANOCYTIC NEVI OF TRUNK: Status: ACTIVE | Noted: 2022-08-19

## 2024-07-15 PROBLEM — L81.4 OTHER MELANIN HYPERPIGMENTATION: Status: ACTIVE | Noted: 2021-08-27

## 2024-07-15 PROBLEM — D18.01 HEMANGIOMA OF SKIN AND SUBCUTANEOUS TISSUE: Status: ACTIVE | Noted: 2019-01-08

## 2024-07-15 PROBLEM — U07.1 DISEASE DUE TO SEVERE ACUTE RESPIRATORY SYNDROME CORONAVIRUS 2 (SARS-COV-2): Status: RESOLVED | Noted: 2020-12-06 | Resolved: 2024-07-15

## 2024-07-15 PROBLEM — J02.9 SORE THROAT: Status: RESOLVED | Noted: 2024-07-15 | Resolved: 2024-07-15

## 2024-07-15 PROBLEM — J02.9 SORE THROAT: Status: ACTIVE | Noted: 2024-07-15

## 2024-07-15 PROBLEM — D23.72 OTHER BENIGN NEOPLASM OF SKIN OF LEFT LOWER LIMB, INCLUDING HIP: Status: ACTIVE | Noted: 2021-08-27

## 2024-07-15 PROBLEM — L85.2: Status: ACTIVE | Noted: 2024-07-15

## 2024-07-15 PROBLEM — R53.83 FATIGUE: Status: RESOLVED | Noted: 2024-07-15 | Resolved: 2024-07-15

## 2024-07-15 PROCEDURE — 1160F RVW MEDS BY RX/DR IN RCRD: CPT | Performed by: INTERNAL MEDICINE

## 2024-07-15 PROCEDURE — 1159F MED LIST DOCD IN RCRD: CPT | Performed by: INTERNAL MEDICINE

## 2024-07-15 PROCEDURE — 99215 OFFICE O/P EST HI 40 MIN: CPT | Performed by: INTERNAL MEDICINE

## 2024-07-15 ASSESSMENT — ENCOUNTER SYMPTOMS
INSOMNIA: 1
ABDOMINAL PAIN: 0
FATIGUE: 0
DIZZINESS: 0
MYALGIAS: 0
SHORTNESS OF BREATH: 0

## 2024-07-15 NOTE — ASSESSMENT & PLAN NOTE
Discussed flat feet and left ankle deformity.  Rec orthotics at all times.  Rec ortho consult, pt declined.  Call if further problems.

## 2024-07-15 NOTE — PROGRESS NOTES
"Subjective   Patient ID: Kei Carlton is a 69 y.o. male who presents for Follow-up (6m fu ) chronic medical problems.    Doing well.  Naps in the afternoon.  Not always energetic in am.  Wife reports snoring w/o apnea.  Worse after covid 3 wks ago.  Discussed home sleep study.  Daily walking, 5-6 times a week.  Some weights.  No stretching.  Pain left foot worse with ROM.  Sometimes feels weak.  Does wear orthotics.  Pain seems to be worse when not wearing orthotics.  Pain in right knee, sometimes give way pain.  Fell after walking 50 yards.  Pt declined workup, xrays and ortho.  Compliant with sertraline for bladder condition.  Also less anxious, feels good.  Meds and labs reviewed.    Hyperlipidemia  This is a chronic problem. The current episode started more than 1 year ago. The problem is controlled. Recent lipid tests were reviewed and are low. There are no known factors aggravating his hyperlipidemia. Pertinent negatives include no chest pain, myalgias or shortness of breath. Current antihyperlipidemic treatment includes statins. The current treatment provides significant improvement of lipids.   Insomnia  This is a chronic problem. The current episode started more than 1 year ago. The problem occurs intermittently. The problem has been resolved. Pertinent negatives include no abdominal pain, chest pain, fatigue or myalgias. The treatment provided significant relief.        Review of Systems   Constitutional:  Negative for fatigue.   Respiratory:  Negative for shortness of breath.    Cardiovascular:  Negative for chest pain.   Gastrointestinal:  Negative for abdominal pain.   Musculoskeletal:  Negative for myalgias.        Foot and knee pain.   Neurological:  Negative for dizziness.   Psychiatric/Behavioral:  The patient has insomnia.        Objective   /68 (BP Location: Right arm, Patient Position: Sitting, BP Cuff Size: Adult)   Pulse 73   Ht 1.753 m (5' 9\")   Wt 83.3 kg (183 lb 9.6 oz)   SpO2 93% "   BMI 27.11 kg/m²     Physical Exam  Constitutional:       Appearance: Normal appearance.   Neck:      Vascular: No carotid bruit.   Cardiovascular:      Rate and Rhythm: Normal rate and regular rhythm.      Pulses: Normal pulses.      Heart sounds: Normal heart sounds.   Pulmonary:      Effort: Pulmonary effort is normal.      Breath sounds: Normal breath sounds.   Musculoskeletal:         General: Tenderness and deformity present.      Comments: Flat left foot.  Left ankle deformity.   Skin:     Comments: Soft tissue mass, moveable left lateral chest area, smaller mass noted right posterior neck.   Neurological:      General: No focal deficit present.      Mental Status: He is alert.   Psychiatric:         Mood and Affect: Mood normal.         Behavior: Behavior normal.         Thought Content: Thought content normal.         Judgment: Judgment normal.         Assessment/Plan   Problem List Items Addressed This Visit             ICD-10-CM    Daytime somnolence R40.0     Rec home sleep study.         Relevant Orders    Home sleep apnea test (HSAT)    Insomnia, unspecified G47.00     Sleep improved with trazodone.  Continue med as discussed.         Relevant Orders    Home sleep apnea test (HSAT)    Hypercholesterolemia - Primary E78.00     Lipids, LDL at goal with statin.  Recommend low fat/cholesterol diet.           Relevant Orders    CBC and Auto Differential    Lipid Panel    TSH with reflex to Free T4 if abnormal    Comprehensive metabolic panel    Prediabetes R73.03     A1c 5.8 with diet.         Relevant Orders    Hemoglobin A1C    CBC and Auto Differential    Comprehensive metabolic panel    Shy bladder syndrome F40.10     Condition better with sertraline.  Will fu with psychiatry.         Snoring R06.83     Rec home sleep study.         Relevant Orders    Home sleep apnea test (HSAT)    Elevated liver enzymes R74.8     Rec US to further evaluate.  FH pancreatic cancer.         Relevant Orders    US  abdomen limited liver    Family history of pancreatic cancer Z80.0    Relevant Orders    US abdomen limited liver    Soft tissue mass M79.89     Rec US to further evaluate soft tissue mass.         Relevant Orders    Point of Care Ultrasound    Flat feet, bilateral M21.41, M21.42     Discussed flat feet and left ankle deformity.  Rec orthotics at all times.  Rec ortho consult, pt declined.  Call if further problems.

## 2024-07-16 ENCOUNTER — TELEPHONE (OUTPATIENT)
Dept: PRIMARY CARE | Facility: CLINIC | Age: 69
End: 2024-07-16
Payer: MEDICARE

## 2024-07-16 DIAGNOSIS — M79.89 SOFT TISSUE MASS: ICD-10-CM

## 2024-07-16 NOTE — TELEPHONE ENCOUNTER
Pt called and the ultrasound on his back needs to be reordered as someone scheaduled it for yesterday, please advise

## 2024-07-17 NOTE — TELEPHONE ENCOUNTER
He thinks its the way it was put in , he hasn't had ultasound on back yet,  imaging says it needs to be reordered please advise

## 2024-07-19 DIAGNOSIS — M79.89 SOFT TISSUE MASS: Primary | ICD-10-CM

## 2024-07-28 ENCOUNTER — PROCEDURE VISIT (OUTPATIENT)
Dept: SLEEP MEDICINE | Facility: HOSPITAL | Age: 69
End: 2024-07-28
Payer: MEDICARE

## 2024-07-28 DIAGNOSIS — R06.83 SNORING: ICD-10-CM

## 2024-07-28 DIAGNOSIS — R40.0 DAYTIME SOMNOLENCE: ICD-10-CM

## 2024-07-28 DIAGNOSIS — G47.00 INSOMNIA, UNSPECIFIED TYPE: ICD-10-CM

## 2024-07-28 PROCEDURE — 95806 SLEEP STUDY UNATT&RESP EFFT: CPT | Performed by: INTERNAL MEDICINE

## 2024-07-29 ENCOUNTER — HOSPITAL ENCOUNTER (OUTPATIENT)
Dept: RADIOLOGY | Facility: CLINIC | Age: 69
Discharge: HOME | End: 2024-07-29
Payer: MEDICARE

## 2024-07-29 ENCOUNTER — TELEPHONE (OUTPATIENT)
Dept: PRIMARY CARE | Facility: CLINIC | Age: 69
End: 2024-07-29
Payer: MEDICARE

## 2024-07-29 DIAGNOSIS — Z80.0 FAMILY HISTORY OF PANCREATIC CANCER: ICD-10-CM

## 2024-07-29 DIAGNOSIS — M79.89 SOFT TISSUE MASS: ICD-10-CM

## 2024-07-29 DIAGNOSIS — R74.8 ELEVATED LIVER ENZYMES: ICD-10-CM

## 2024-07-29 DIAGNOSIS — M79.89 SOFT TISSUE MASS: Primary | ICD-10-CM

## 2024-07-29 PROCEDURE — 76881 US COMPL JOINT R-T W/IMG: CPT

## 2024-07-29 PROCEDURE — 76705 ECHO EXAM OF ABDOMEN: CPT | Performed by: RADIOLOGY

## 2024-07-29 PROCEDURE — 76705 ECHO EXAM OF ABDOMEN: CPT

## 2024-07-29 NOTE — TELEPHONE ENCOUNTER
Pt needs the order to read per radiology    NON VASCULAR EXTREMITY  ULTRASOUND   He will have it done today along with his ultrasound   PLEASE ADVISE

## 2024-08-02 ENCOUNTER — TELEPHONE (OUTPATIENT)
Dept: PRIMARY CARE | Facility: CLINIC | Age: 69
End: 2024-08-02
Payer: MEDICARE

## 2024-08-02 NOTE — TELEPHONE ENCOUNTER
----- Message from Perico Hinton sent at 8/2/2024  9:13 AM EDT -----  US RUQ  Liver, gallbladder and visualized pancreas normal  Benign appearing right kidney cysts, not serious    US of soft tissue mass  Most likely is lipoma, fatty growth  Consider 2nd opinion with general surgery   Let me know

## 2024-08-05 ENCOUNTER — APPOINTMENT (OUTPATIENT)
Dept: PRIMARY CARE | Facility: CLINIC | Age: 69
End: 2024-08-05
Payer: MEDICARE

## 2024-08-05 VITALS
WEIGHT: 185 LBS | SYSTOLIC BLOOD PRESSURE: 118 MMHG | HEART RATE: 67 BPM | OXYGEN SATURATION: 95 % | BODY MASS INDEX: 27.4 KG/M2 | DIASTOLIC BLOOD PRESSURE: 68 MMHG | HEIGHT: 69 IN

## 2024-08-05 DIAGNOSIS — K82.4 GALLBLADDER POLYP: ICD-10-CM

## 2024-08-05 DIAGNOSIS — M79.89 SOFT TISSUE MASS: Primary | ICD-10-CM

## 2024-08-05 PROCEDURE — 1159F MED LIST DOCD IN RCRD: CPT | Performed by: INTERNAL MEDICINE

## 2024-08-05 PROCEDURE — 3008F BODY MASS INDEX DOCD: CPT | Performed by: INTERNAL MEDICINE

## 2024-08-05 PROCEDURE — 1160F RVW MEDS BY RX/DR IN RCRD: CPT | Performed by: INTERNAL MEDICINE

## 2024-08-05 PROCEDURE — 99213 OFFICE O/P EST LOW 20 MIN: CPT | Performed by: INTERNAL MEDICINE

## 2024-08-05 ASSESSMENT — ENCOUNTER SYMPTOMS
BACK PAIN: 0
SHORTNESS OF BREATH: 0
DIZZINESS: 0
FATIGUE: 0
ABDOMINAL PAIN: 0

## 2024-08-05 NOTE — PROGRESS NOTES
"Subjective   Patient ID: Kei Carlton is a 69 y.o. male who presents for Follow-up chronic medical problems.     Now wearing walking shoes in house.  No longer has pain from flat feet.  Here to review US.  Completed sleep study.  Wife reports snoring, no witnessed apnea.  Worse with weight gain.  US reviewed.       Review of Systems   Constitutional:  Negative for fatigue.   Respiratory:  Negative for shortness of breath.    Cardiovascular:  Negative for chest pain.   Gastrointestinal:  Negative for abdominal pain.   Musculoskeletal:  Negative for back pain.   Neurological:  Negative for dizziness.       Objective   /68 (BP Location: Right arm, Patient Position: Sitting)   Pulse 67   Ht 1.753 m (5' 9\")   Wt 83.9 kg (185 lb)   SpO2 95%   BMI 27.32 kg/m²     Physical Exam  Constitutional:       Appearance: Normal appearance.   Cardiovascular:      Rate and Rhythm: Normal rate and regular rhythm.      Pulses: Normal pulses.      Heart sounds: Normal heart sounds.   Pulmonary:      Effort: Pulmonary effort is normal.      Breath sounds: Normal breath sounds.   Skin:     Comments: Soft tissue mass right posterior neck and left lateral back area.  Freely moveable, no tenderness.   Neurological:      General: No focal deficit present.      Mental Status: He is alert.   Psychiatric:         Mood and Affect: Mood normal.         Behavior: Behavior normal.         Thought Content: Thought content normal.         Judgment: Judgment normal.         Assessment/Plan   Problem List Items Addressed This Visit             ICD-10-CM    Soft tissue mass - Primary M79.89     US confirms well defined soft tissue mass, 6.6 cm x 2.5 cm x 6.8 cm left lateral back.  Pt declined surgical consult.  Will fu with next ov.         Gallbladder polyp K82.4     Small 2-3 mm polyp noted on US.  Will fu in 1 year.         Relevant Orders    US right upper quadrant          "

## 2024-08-05 NOTE — ASSESSMENT & PLAN NOTE
US confirms well defined soft tissue mass, 6.6 cm x 2.5 cm x 6.8 cm left lateral back.  Pt declined surgical consult.  Will fu with next ov.

## 2024-08-14 ENCOUNTER — APPOINTMENT (OUTPATIENT)
Dept: PRIMARY CARE | Facility: CLINIC | Age: 69
End: 2024-08-14
Payer: MEDICARE

## 2024-09-25 ENCOUNTER — APPOINTMENT (OUTPATIENT)
Dept: PRIMARY CARE | Facility: CLINIC | Age: 69
End: 2024-09-25
Payer: MEDICARE

## 2024-09-25 VITALS
OXYGEN SATURATION: 96 % | BODY MASS INDEX: 27.25 KG/M2 | HEIGHT: 69 IN | SYSTOLIC BLOOD PRESSURE: 124 MMHG | HEART RATE: 73 BPM | WEIGHT: 184 LBS | DIASTOLIC BLOOD PRESSURE: 72 MMHG

## 2024-09-25 DIAGNOSIS — F51.04 CHRONIC INSOMNIA: ICD-10-CM

## 2024-09-25 DIAGNOSIS — G47.33 OSA (OBSTRUCTIVE SLEEP APNEA): Primary | ICD-10-CM

## 2024-09-25 DIAGNOSIS — F40.10 SHY BLADDER SYNDROME: ICD-10-CM

## 2024-09-25 PROCEDURE — 99214 OFFICE O/P EST MOD 30 MIN: CPT | Performed by: INTERNAL MEDICINE

## 2024-09-25 PROCEDURE — 1159F MED LIST DOCD IN RCRD: CPT | Performed by: INTERNAL MEDICINE

## 2024-09-25 PROCEDURE — 1160F RVW MEDS BY RX/DR IN RCRD: CPT | Performed by: INTERNAL MEDICINE

## 2024-09-25 PROCEDURE — 3008F BODY MASS INDEX DOCD: CPT | Performed by: INTERNAL MEDICINE

## 2024-09-25 PROCEDURE — 1124F ACP DISCUSS-NO DSCNMKR DOCD: CPT | Performed by: INTERNAL MEDICINE

## 2024-09-25 RX ORDER — TRAZODONE HYDROCHLORIDE 150 MG/1
150 TABLET ORAL NIGHTLY
Qty: 90 TABLET | Refills: 1 | Status: SHIPPED | OUTPATIENT
Start: 2024-09-25

## 2024-09-25 ASSESSMENT — ENCOUNTER SYMPTOMS
DIZZINESS: 0
FATIGUE: 1
ABDOMINAL PAIN: 0
SHORTNESS OF BREATH: 0

## 2024-09-25 NOTE — PROGRESS NOTES
"Subjective   Patient ID: Kei Carlton is a 69 y.o. male who presents for Follow-up (After sleep study) and chronic medical problems.    Reviewed home sleep study.  Moderate JAYDON with O2 adam 84.5%.  Discussed options with CPAP vs sleep medicine consult.  Does not feels rested in morning.  Tired during the day, taking naps.  Sleep improved with trazodone 150 mg bedtime.  Also taking sertraline 50 mg for shy bladder.  Meds reviewed.         Review of Systems   Constitutional:  Positive for fatigue.   Respiratory:  Negative for shortness of breath.    Cardiovascular:  Negative for chest pain.   Gastrointestinal:  Negative for abdominal pain.   Neurological:  Negative for dizziness.       Objective   /72 (BP Location: Right arm, Patient Position: Sitting)   Pulse 73   Ht 1.753 m (5' 9\")   Wt 83.5 kg (184 lb)   SpO2 96%   BMI 27.17 kg/m²     Physical Exam  Constitutional:       Appearance: Normal appearance.   Neurological:      General: No focal deficit present.      Mental Status: He is alert.   Psychiatric:         Mood and Affect: Mood normal.         Behavior: Behavior normal.         Thought Content: Thought content normal.         Judgment: Judgment normal.         Assessment/Plan   Problem List Items Addressed This Visit             ICD-10-CM    Chronic insomnia F51.04     Sleep improved with trazodone 150 mg bedtime.         Relevant Medications    traZODone (Desyrel) 150 mg tablet    Shy bladder syndrome F40.10     Continue sertraline 50 mg but take in am.         JAYDON (obstructive sleep apnea) - Primary G47.33     Rec CPAP, auto 5-20 cm H2O.         Relevant Orders    Positive Airway Pressure (PAP) Therapy          "

## 2024-09-27 DIAGNOSIS — E78.00 PURE HYPERCHOLESTEROLEMIA, UNSPECIFIED: ICD-10-CM

## 2024-09-30 RX ORDER — ATORVASTATIN CALCIUM 20 MG/1
TABLET, FILM COATED ORAL
Qty: 90 TABLET | Refills: 3 | Status: SHIPPED | OUTPATIENT
Start: 2024-09-30

## 2024-10-09 ENCOUNTER — APPOINTMENT (OUTPATIENT)
Dept: DERMATOLOGY | Facility: CLINIC | Age: 69
End: 2024-10-09
Payer: MEDICARE

## 2024-10-11 ENCOUNTER — APPOINTMENT (OUTPATIENT)
Dept: DERMATOLOGY | Facility: CLINIC | Age: 69
End: 2024-10-11
Payer: MEDICARE

## 2025-01-07 ENCOUNTER — LAB (OUTPATIENT)
Dept: LAB | Facility: LAB | Age: 70
End: 2025-01-07
Payer: MEDICARE

## 2025-01-07 DIAGNOSIS — R73.03 PREDIABETES: ICD-10-CM

## 2025-01-07 DIAGNOSIS — E78.00 HYPERCHOLESTEROLEMIA: ICD-10-CM

## 2025-01-07 LAB
ALBUMIN SERPL BCP-MCNC: 4.3 G/DL (ref 3.4–5)
ALP SERPL-CCNC: 70 U/L (ref 33–136)
ALT SERPL W P-5'-P-CCNC: 30 U/L (ref 10–52)
ANION GAP SERPL CALC-SCNC: 13 MMOL/L (ref 10–20)
AST SERPL W P-5'-P-CCNC: 26 U/L (ref 9–39)
BASOPHILS # BLD AUTO: 0.05 X10*3/UL (ref 0–0.1)
BASOPHILS NFR BLD AUTO: 0.8 %
BILIRUB SERPL-MCNC: 0.9 MG/DL (ref 0–1.2)
BUN SERPL-MCNC: 16 MG/DL (ref 6–23)
CALCIUM SERPL-MCNC: 9.5 MG/DL (ref 8.6–10.6)
CHLORIDE SERPL-SCNC: 102 MMOL/L (ref 98–107)
CHOLEST SERPL-MCNC: 148 MG/DL (ref 0–199)
CHOLESTEROL/HDL RATIO: 2.8
CO2 SERPL-SCNC: 25 MMOL/L (ref 21–32)
CREAT SERPL-MCNC: 0.91 MG/DL (ref 0.5–1.3)
EGFRCR SERPLBLD CKD-EPI 2021: >90 ML/MIN/1.73M*2
EOSINOPHIL # BLD AUTO: 0.15 X10*3/UL (ref 0–0.7)
EOSINOPHIL NFR BLD AUTO: 2.5 %
ERYTHROCYTE [DISTWIDTH] IN BLOOD BY AUTOMATED COUNT: 12.9 % (ref 11.5–14.5)
EST. AVERAGE GLUCOSE BLD GHB EST-MCNC: 111 MG/DL
GLUCOSE SERPL-MCNC: 98 MG/DL (ref 74–99)
HBA1C MFR BLD: 5.5 %
HCT VFR BLD AUTO: 46.1 % (ref 41–52)
HDLC SERPL-MCNC: 52.7 MG/DL
HGB BLD-MCNC: 15.1 G/DL (ref 13.5–17.5)
IMM GRANULOCYTES # BLD AUTO: 0.04 X10*3/UL (ref 0–0.7)
IMM GRANULOCYTES NFR BLD AUTO: 0.7 % (ref 0–0.9)
LDLC SERPL CALC-MCNC: 81 MG/DL
LYMPHOCYTES # BLD AUTO: 1.83 X10*3/UL (ref 1.2–4.8)
LYMPHOCYTES NFR BLD AUTO: 30.9 %
MCH RBC QN AUTO: 31.5 PG (ref 26–34)
MCHC RBC AUTO-ENTMCNC: 32.8 G/DL (ref 32–36)
MCV RBC AUTO: 96 FL (ref 80–100)
MONOCYTES # BLD AUTO: 0.51 X10*3/UL (ref 0.1–1)
MONOCYTES NFR BLD AUTO: 8.6 %
NEUTROPHILS # BLD AUTO: 3.34 X10*3/UL (ref 1.2–7.7)
NEUTROPHILS NFR BLD AUTO: 56.5 %
NON HDL CHOLESTEROL: 95 MG/DL (ref 0–149)
NRBC BLD-RTO: 0 /100 WBCS (ref 0–0)
PLATELET # BLD AUTO: 187 X10*3/UL (ref 150–450)
POTASSIUM SERPL-SCNC: 4.3 MMOL/L (ref 3.5–5.3)
PROT SERPL-MCNC: 6.7 G/DL (ref 6.4–8.2)
RBC # BLD AUTO: 4.79 X10*6/UL (ref 4.5–5.9)
SODIUM SERPL-SCNC: 136 MMOL/L (ref 136–145)
TRIGL SERPL-MCNC: 72 MG/DL (ref 0–149)
TSH SERPL-ACNC: 1.02 MIU/L (ref 0.44–3.98)
VLDL: 14 MG/DL (ref 0–40)
WBC # BLD AUTO: 5.9 X10*3/UL (ref 4.4–11.3)

## 2025-01-07 PROCEDURE — 80061 LIPID PANEL: CPT

## 2025-01-07 PROCEDURE — 83036 HEMOGLOBIN GLYCOSYLATED A1C: CPT

## 2025-01-07 PROCEDURE — 85025 COMPLETE CBC W/AUTO DIFF WBC: CPT

## 2025-01-07 PROCEDURE — 80053 COMPREHEN METABOLIC PANEL: CPT

## 2025-01-07 PROCEDURE — 84443 ASSAY THYROID STIM HORMONE: CPT

## 2025-01-13 ENCOUNTER — APPOINTMENT (OUTPATIENT)
Dept: PRIMARY CARE | Facility: CLINIC | Age: 70
End: 2025-01-13
Payer: MEDICARE

## 2025-01-13 VITALS
SYSTOLIC BLOOD PRESSURE: 118 MMHG | HEART RATE: 80 BPM | OXYGEN SATURATION: 96 % | DIASTOLIC BLOOD PRESSURE: 70 MMHG | HEIGHT: 69 IN | WEIGHT: 186 LBS | BODY MASS INDEX: 27.55 KG/M2

## 2025-01-13 DIAGNOSIS — N40.1 BENIGN PROSTATIC HYPERPLASIA WITH NOCTURIA: ICD-10-CM

## 2025-01-13 DIAGNOSIS — Z80.0 FAMILY HISTORY OF PANCREATIC CANCER: ICD-10-CM

## 2025-01-13 DIAGNOSIS — F51.04 CHRONIC INSOMNIA: ICD-10-CM

## 2025-01-13 DIAGNOSIS — G47.33 OSA ON CPAP: ICD-10-CM

## 2025-01-13 DIAGNOSIS — F40.10 SHY BLADDER SYNDROME: ICD-10-CM

## 2025-01-13 DIAGNOSIS — R35.1 BENIGN PROSTATIC HYPERPLASIA WITH NOCTURIA: ICD-10-CM

## 2025-01-13 DIAGNOSIS — E78.00 HYPERCHOLESTEROLEMIA: ICD-10-CM

## 2025-01-13 DIAGNOSIS — Z00.00 ROUTINE GENERAL MEDICAL EXAMINATION AT HEALTH CARE FACILITY: Primary | ICD-10-CM

## 2025-01-13 DIAGNOSIS — R73.03 PREDIABETES: ICD-10-CM

## 2025-01-13 PROCEDURE — 1170F FXNL STATUS ASSESSED: CPT | Performed by: INTERNAL MEDICINE

## 2025-01-13 PROCEDURE — G0439 PPPS, SUBSEQ VISIT: HCPCS | Performed by: INTERNAL MEDICINE

## 2025-01-13 PROCEDURE — 1159F MED LIST DOCD IN RCRD: CPT | Performed by: INTERNAL MEDICINE

## 2025-01-13 PROCEDURE — 3008F BODY MASS INDEX DOCD: CPT | Performed by: INTERNAL MEDICINE

## 2025-01-13 PROCEDURE — 99214 OFFICE O/P EST MOD 30 MIN: CPT | Performed by: INTERNAL MEDICINE

## 2025-01-13 PROCEDURE — 1160F RVW MEDS BY RX/DR IN RCRD: CPT | Performed by: INTERNAL MEDICINE

## 2025-01-13 RX ORDER — SERTRALINE HYDROCHLORIDE 50 MG/1
50 TABLET, FILM COATED ORAL DAILY
Qty: 90 TABLET | Refills: 1 | Status: SHIPPED | OUTPATIENT
Start: 2025-01-13

## 2025-01-13 RX ORDER — TADALAFIL 5 MG/1
5 TABLET ORAL DAILY
COMMUNITY

## 2025-01-13 ASSESSMENT — ACTIVITIES OF DAILY LIVING (ADL)
TAKING_MEDICATION: INDEPENDENT
DOING_HOUSEWORK: INDEPENDENT
DRESSING: INDEPENDENT
BATHING: INDEPENDENT
MANAGING_FINANCES: INDEPENDENT
GROCERY_SHOPPING: INDEPENDENT

## 2025-01-13 ASSESSMENT — PATIENT HEALTH QUESTIONNAIRE - PHQ9
2. FEELING DOWN, DEPRESSED OR HOPELESS: NOT AT ALL
SUM OF ALL RESPONSES TO PHQ9 QUESTIONS 1 AND 2: 0
1. LITTLE INTEREST OR PLEASURE IN DOING THINGS: NOT AT ALL

## 2025-01-13 ASSESSMENT — ENCOUNTER SYMPTOMS
NUMBNESS: 1
DIZZINESS: 0
SHORTNESS OF BREATH: 0
FATIGUE: 0
MYALGIAS: 0
ABDOMINAL PAIN: 0
INSOMNIA: 1

## 2025-01-13 NOTE — ASSESSMENT & PLAN NOTE
Moderate severity JAYDON.  Pt is compliant with CPAP, using more than 4 hours every night with improvement in medical conditions.

## 2025-01-13 NOTE — PROGRESS NOTES
Subjective   Reason for Visit: Kei Carlton is an 69 y.o. male here for a Medicare Wellness visit and chronic medical problems.    Past Medical, Surgical, and Family History reviewed and updated in chart.    Reviewed all medications by prescribing practitioner or clinical pharmacist (such as prescriptions, OTCs, herbal therapies and supplements) and documented in the medical record.    Started CPAP around .  Compliant with CPAP every night for more than 4 hours with improvement in medical conditions.  Sleep improved, 7-8 hours.  Not taking as many naps now.  Urology following PSA.  Meds and labs reviewed.    Hyperlipidemia  This is a chronic problem. The current episode started more than 1 year ago. The problem is controlled. Recent lipid tests were reviewed and are low. Pertinent negatives include no chest pain, myalgias or shortness of breath. Current antihyperlipidemic treatment includes statins. The current treatment provides significant improvement of lipids. There are no compliance problems.    Insomnia  This is a chronic problem. The current episode started more than 1 year ago. The problem occurs intermittently. The problem has been resolved. Associated symptoms include numbness. Pertinent negatives include no abdominal pain, chest pain, fatigue or myalgias. The treatment provided significant relief.       Patient Care Team:  Perico Hinton MD as PCP - General  Per[facility] Hinton MD [facility] P[facility]ributed Provider     Review of Systems   Constitutional:  Negative for fatigue.   Respiratory:  Negative for shortness of breath.    Cardiovascular:  Negative for chest pain.   Gastrointestinal:  Negative for abdominal pain.   Musculoskeletal:  Negative for myalgias.   Neurological:  Positive for numbness. Negative for dizziness.   Psychiatric/Behavioral:  The patient has insomnia.        Objective   Vitals:  /70 (BP Location: Right arm, Patient Position: Sitting)   Pulse 80   Ht 1.753 m (5'  "9\")   Wt 84.4 kg (186 lb)   SpO2 96%   BMI 27.47 kg/m²       Physical Exam  Constitutional:       Appearance: Normal appearance.   Neck:      Vascular: No carotid bruit.   Cardiovascular:      Rate and Rhythm: Normal rate and regular rhythm.      Pulses: Normal pulses.      Heart sounds: Normal heart sounds.   Pulmonary:      Effort: Pulmonary effort is normal.      Breath sounds: Normal breath sounds.   Abdominal:      General: Bowel sounds are normal.      Palpations: Abdomen is soft.   Neurological:      General: No focal deficit present.      Mental Status: He is alert.   Psychiatric:         Mood and Affect: Mood normal.         Behavior: Behavior normal.         Thought Content: Thought content normal.         Judgment: Judgment normal.         Assessment & Plan  Routine general medical examination at health care facility    Orders:    1 Year Follow Up In Primary Care - Wellness Exam; Future    JAYDON on CPAP  Moderate severity JAYDON.  Pt is compliant with CPAP, using more than 4 hours every night with improvement in medical conditions.         Hypercholesterolemia  Lipids, LDL at goal with statin.  Recommend low fat/cholesterol diet.      Orders:    Lipid Panel; Future    ALT; Future    Prediabetes  A1c 5.5 with diet.    Orders:    Hemoglobin A1C; Future    Basic metabolic panel; Future    Shy bladder syndrome  Condition improved with sertraline and counselor.    Orders:    sertraline (Zoloft) 50 mg tablet; Take 1 tablet (50 mg) by mouth once daily.    Benign prostatic hyperplasia with nocturia  Condition improved with flomax and cialis.  Followed with urology.         Chronic insomnia  Sleep improved with trazodone and CPAP.         Family history of pancreatic cancer  Discussed MRI pancreas, self pay.    Orders:    MR pancreas screening self pay; Future              "

## 2025-01-13 NOTE — ASSESSMENT & PLAN NOTE
Condition improved with sertraline and counselor.    Orders:    sertraline (Zoloft) 50 mg tablet; Take 1 tablet (50 mg) by mouth once daily.

## 2025-01-13 NOTE — ASSESSMENT & PLAN NOTE
Lipids, LDL at goal with statin.  Recommend low fat/cholesterol diet.      Orders:    Lipid Panel; Future    ALT; Future

## 2025-02-03 ENCOUNTER — APPOINTMENT (OUTPATIENT)
Dept: RADIOLOGY | Facility: HOSPITAL | Age: 70
End: 2025-02-03

## 2025-02-07 ENCOUNTER — APPOINTMENT (OUTPATIENT)
Dept: DERMATOLOGY | Facility: CLINIC | Age: 70
End: 2025-02-07
Payer: MEDICARE

## 2025-02-12 ENCOUNTER — APPOINTMENT (OUTPATIENT)
Dept: DERMATOLOGY | Facility: CLINIC | Age: 70
End: 2025-02-12
Payer: MEDICARE

## 2025-02-25 ENCOUNTER — APPOINTMENT (OUTPATIENT)
Dept: RADIOLOGY | Facility: HOSPITAL | Age: 70
End: 2025-02-25

## 2025-03-11 ENCOUNTER — APPOINTMENT (OUTPATIENT)
Dept: PRIMARY CARE | Facility: CLINIC | Age: 70
End: 2025-03-11
Payer: MEDICARE

## 2025-03-11 ENCOUNTER — HOSPITAL ENCOUNTER (OUTPATIENT)
Dept: RADIOLOGY | Facility: CLINIC | Age: 70
Discharge: HOME | End: 2025-03-11
Payer: MEDICARE

## 2025-03-11 VITALS
OXYGEN SATURATION: 96 % | DIASTOLIC BLOOD PRESSURE: 70 MMHG | BODY MASS INDEX: 27.11 KG/M2 | HEART RATE: 74 BPM | SYSTOLIC BLOOD PRESSURE: 120 MMHG | WEIGHT: 183 LBS | HEIGHT: 69 IN

## 2025-03-11 DIAGNOSIS — N27.0 UNILATERAL SMALL KIDNEY: ICD-10-CM

## 2025-03-11 DIAGNOSIS — M25.811 CLICKING RIGHT SHOULDER: ICD-10-CM

## 2025-03-11 DIAGNOSIS — M79.621 PAIN IN RIGHT UPPER ARM: Primary | ICD-10-CM

## 2025-03-11 DIAGNOSIS — M79.621 PAIN IN RIGHT UPPER ARM: ICD-10-CM

## 2025-03-11 PROCEDURE — 73030 X-RAY EXAM OF SHOULDER: CPT | Mod: RT

## 2025-03-11 PROCEDURE — 73030 X-RAY EXAM OF SHOULDER: CPT | Mod: RIGHT SIDE | Performed by: RADIOLOGY

## 2025-03-11 PROCEDURE — 99213 OFFICE O/P EST LOW 20 MIN: CPT | Performed by: INTERNAL MEDICINE

## 2025-03-11 PROCEDURE — 3008F BODY MASS INDEX DOCD: CPT | Performed by: INTERNAL MEDICINE

## 2025-03-11 PROCEDURE — 1159F MED LIST DOCD IN RCRD: CPT | Performed by: INTERNAL MEDICINE

## 2025-03-11 PROCEDURE — 1124F ACP DISCUSS-NO DSCNMKR DOCD: CPT | Performed by: INTERNAL MEDICINE

## 2025-03-11 PROCEDURE — 1160F RVW MEDS BY RX/DR IN RCRD: CPT | Performed by: INTERNAL MEDICINE

## 2025-03-11 ASSESSMENT — ENCOUNTER SYMPTOMS
FATIGUE: 0
SHORTNESS OF BREATH: 0

## 2025-03-11 NOTE — PROGRESS NOTES
"Subjective   Patient ID: Kei Carlton is a 70 y.o. male who presents for OTHER (Pulled muscle right arm).    Injured right arm lifting weights.  Lifting 20 pounds, standing arm raise.  No change in weight, reps.  Pain in right upper arm.  Occurred 2-3 weeks ago.  Now hears a click in shoulder.  Right hand dominant.  Able to lift arm over head.  Otc motrin for pain with relief.  Meds reviewed.       Review of Systems   Constitutional:  Negative for fatigue.   Respiratory:  Negative for shortness of breath.    Cardiovascular:  Negative for chest pain.   Musculoskeletal:         Right arm pain.       Objective   /70 (BP Location: Right arm, Patient Position: Sitting)   Pulse 74   Ht 1.753 m (5' 9\")   Wt 83 kg (183 lb)   SpO2 96%   BMI 27.02 kg/m²     Physical Exam  Constitutional:       Appearance: Normal appearance.   Cardiovascular:      Rate and Rhythm: Normal rate and regular rhythm.   Pulmonary:      Effort: Pulmonary effort is normal.      Breath sounds: Normal breath sounds.   Musculoskeletal:         General: Tenderness present.      Comments: Slight decrease in ROM right arm/shoulder.   Neurological:      General: No focal deficit present.      Mental Status: He is alert.   Psychiatric:         Mood and Affect: Mood normal.         Behavior: Behavior normal.         Thought Content: Thought content normal.         Judgment: Judgment normal.         Assessment/Plan     Rec right shoulder xrays.  Rec PT for right upper arm pain and shoulder clicking.  Rec tylenol for pain as needed.  Avoid NSAIDs.  History of solitary kidney.  Urology following renal cysts.  Due for renal US this year.    Problem List Items Addressed This Visit             ICD-10-CM    Unilateral small kidney N27.0    Pain in right upper arm - Primary M79.621    Relevant Orders    Referral to Physical Therapy    XR shoulder right 2+ views    Clicking right shoulder M25.811    Relevant Orders    Referral to Physical Therapy    XR " shoulder right 2+ views

## 2025-03-21 DIAGNOSIS — F51.04 CHRONIC INSOMNIA: ICD-10-CM

## 2025-03-24 RX ORDER — TRAZODONE HYDROCHLORIDE 150 MG/1
150 TABLET ORAL NIGHTLY
Qty: 90 TABLET | Refills: 1 | Status: SHIPPED | OUTPATIENT
Start: 2025-03-24

## 2025-03-25 ENCOUNTER — EVALUATION (OUTPATIENT)
Dept: PHYSICAL THERAPY | Facility: CLINIC | Age: 70
End: 2025-03-25
Payer: MEDICARE

## 2025-03-25 DIAGNOSIS — M25.811 CLICKING RIGHT SHOULDER: ICD-10-CM

## 2025-03-25 DIAGNOSIS — M25.511 ACUTE PAIN OF RIGHT SHOULDER: Primary | ICD-10-CM

## 2025-03-25 DIAGNOSIS — M79.621 PAIN IN RIGHT UPPER ARM: ICD-10-CM

## 2025-03-25 PROCEDURE — 97110 THERAPEUTIC EXERCISES: CPT | Mod: GP | Performed by: PHYSICAL THERAPIST

## 2025-03-25 PROCEDURE — 97161 PT EVAL LOW COMPLEX 20 MIN: CPT | Mod: GP | Performed by: PHYSICAL THERAPIST

## 2025-03-25 SDOH — ECONOMIC STABILITY: FOOD INSECURITY: WITHIN THE PAST 12 MONTHS, THE FOOD YOU BOUGHT JUST DIDN'T LAST AND YOU DIDN'T HAVE MONEY TO GET MORE.: NEVER TRUE

## 2025-03-25 SDOH — ECONOMIC STABILITY: FOOD INSECURITY: WITHIN THE PAST 12 MONTHS, YOU WORRIED THAT YOUR FOOD WOULD RUN OUT BEFORE YOU GOT MONEY TO BUY MORE.: NEVER TRUE

## 2025-03-25 ASSESSMENT — ENCOUNTER SYMPTOMS
OCCASIONAL FEELINGS OF UNSTEADINESS: 0
LOSS OF SENSATION IN FEET: 0
DEPRESSION: 0

## 2025-03-25 ASSESSMENT — PAIN DESCRIPTION - DESCRIPTORS: DESCRIPTORS: ACHING

## 2025-03-25 ASSESSMENT — LIFESTYLE VARIABLES
HOW OFTEN DO YOU HAVE A DRINK CONTAINING ALCOHOL: NEVER
AUDIT-C TOTAL SCORE: 0
HOW MANY STANDARD DRINKS CONTAINING ALCOHOL DO YOU HAVE ON A TYPICAL DAY: PATIENT DOES NOT DRINK
SKIP TO QUESTIONS 9-10: 1
HOW OFTEN DO YOU HAVE SIX OR MORE DRINKS ON ONE OCCASION: NEVER

## 2025-03-25 ASSESSMENT — PAIN - FUNCTIONAL ASSESSMENT: PAIN_FUNCTIONAL_ASSESSMENT: 0-10

## 2025-03-25 ASSESSMENT — PAIN SCALES - GENERAL: PAINLEVEL_OUTOF10: 0 - NO PAIN

## 2025-03-25 NOTE — PROGRESS NOTES
Physical Therapy    Physical Therapy Upper Extremity Evaluation    Patient Name: Kei Carlton  MRN: [id number]  Today's Date: 3/25/2025  Time Calculation  Start Time: 1400  Stop Time: 1445  Time Calculation (min): 45 min  PT Evaluation Time Entry  PT Evaluation (Low) Time Entry: 35  PT Therapeutic Procedures Time Entry  Therapeutic Exercise Time Entry: 10  Payor: MEDICARE / Plan: MEDICARE PART A AND B / Product Type: *No Product type* /     Reason for Referral: R shoulder Pain  Referred By: YONY Hinton  General Comment: Visit 1 of [place]    Current Problem  Problem List Items Addressed This Visit             ICD-10-CM    Pain in right upper arm M79.621    Relevant Orders    Follow Up In Physical Therapy    Clicking right shoulder M25.811    Relevant Orders    Follow Up In Physical Therapy     Other Visit Diagnoses         Codes    Acute pain of right shoulder    -  Primary M25.511    Relevant Orders    Follow Up In Physical Therapy           Precautions  Precautions  STEADI Fall Risk Score (The score of 4 or more indicates an increased risk of falling): 0  Precautions Comment: none     Pain  Pain Assessment: 0-10  0-10 (Numeric) Pain Score: 0 - No pain (5-6/10 at worst)  Pain Location: Shoulder  Pain Orientation: Right  Pain Radiating Towards: upper R arm  Pain Descriptors: Aching  Pain Frequency: Intermittent  Clinical Progression: Not changed    SUBJECTIVE:   Chief complaint:  Pt reports he was lifting weights overhead while standing and noted he started to get pain in the R shoulder about 1 month ago. Notes pain is still present. Notes no pain rest but notes pain with movements especially overhead. Notes feeling some weakness as well in the shoulder. Denies any significant loss of motion in the shoulder.      Pain Better: rest, tylenol PRN     Pain Worse: lifting, reaching overhead.     Imaging: X-rays 3/11/25 were negative for OA or fracture.     Prior level of function: previously independent with all prior activity.    Current limitations: reaching, lifting overhead.     Home setup: reviewed and no concern     Work: retired     Patients goal: pain free in the shoulder     Prior tx: no piror PT tx this year     Medical hx has been reviewed with the patient.     OBJECTIVE:  Upper Extremity Strength:  MMT 5/5 max  RIGHT LEFT   Shoulder Flexion 4 Pain 5   Shoulder Abduction 4 pain 5   Shoulder ER 4 pain 5   Shoulder IR 4+ 5   Elbow Flexion 5 5   Elbow Extension 5 5     Upper Extremity ROM: WNL unless documented below:  ROM in Degrees RIGHT LEFT   Shoulder Flexion 140 155   Shoulder Abduction 155 170   Shoulder ER 55 70   Shoulder IR 55 WNL   Elbow Flexion WNL WNL   Elbow Extension WNL WNL     Posture Slight forward head and rounded shoulders.   Palpation Tenderness to palpation Infraspinatus/Supraspinatus tendons R shoulder     Other Measures  Disability of Arm Shoulder Hand (DASH): QDASH 20.56    Special tests:  SHOULDER RIGHT LEFT   Hawkin's-Sravan pos    Neer Impingement  pos    Lateral kailee pos    Infraspinatus muscle test pos    Empty can pos       TREATMENT:  Eval  2. Instruction in HEP:  Access Code: [email or web address]  URL: [email or web address]  Date: 03/25/2025  Prepared by: KIMBERLEY Wu    Exercises  - Standing Single Arm Shoulder Flexion Stretch on Wall (Mirrored)  - 1 x daily - 7 x weekly - 1 sets - 10 reps - 5 sec hold  - Seated Shoulder Flexion Towel Slide at Table Top  - 1 x daily - 7 x weekly - 1 sets - 10 reps - 5 sec  hold  - Seated Shoulder External Rotation PROM on Table (Mirrored)  - 1 x daily - 7 x weekly - 1 sets - 10 reps - 5 sec hold  - Seated Scapular Retraction  - 1 x daily - 7 x weekly - 2 sets - 10 reps  - Standing Shoulder Row with Anchored Resistance  - 1 x daily - 7 x weekly - 2 sets - 10 reps  - Shoulder extension with resistance - Neutral  - 1 x daily - 7 x weekly - 2 sets - 10 reps    ASSESSEMENT  Pt is a 70 y.o. referred to physical therapy for a dx of pain upper right arm by Mary Jane  Perico HILL MD . Pt with signs and symptoms of pain, loss of motion, loss of strength, reduced function and reduced posture associated with possible impingement syndrome R shoulder. This pt would benefit from a therapy program to restore prior level of function, reduce pain, increase AROM, increase strength, and improve posture.  Complexity: Low  Clinical presentation: Stable and/or uncomplicated characteristics  The physical therapy prognosis is good for the patient to achieve their goals.   The pt tolerated therapy treatment today well with no adverse effects.  Personal Factors/Barriers to therapy include:  none      PLAN  The pt will be seen 2 days a week for 6 weeks.      The pt has been educated about the risks and benefits of physical therapy including manual therapy treatments. Pt agrees with POC and gives consent for treatment.     The patient will benefit from physical therapy treatment to include: therapeutic exercises, therapeutic activities, neurological re-education, manual therapy, modalities, and a home exercise program.     Goals:  Active       PT Problem       STG:Reduce pain at worst to 2-3/10 with all prior activity.        Start:  03/25/25    Expected End:  04/15/25            STG: Pt to sit with good posture approx 50-75% of the time.        Start:  03/25/25    Expected End:  04/15/25            Reduce pain at worst to 0/10 with all prior activity.        Start:  03/25/25    Expected End:  05/06/25            Increase shoulder AROM (in degrees) flex to 155, Abd 165, ER 40, IR 65 for lifting, ADL's, reaching and self care.        Start:  03/25/25    Expected End:  05/06/25            Pt to have increased shoulder and scapular strength to full MMT grade for improved ADL's, lifting and postural support.        Start:  03/25/25    Expected End:  05/06/25            Pt to decrease quick DASH score by 10-15% to display overall improved shoulder function.        Start:  03/25/25    Expected End:   05/06/25            Pt to be independent with a HEP for self management.        Start:  03/25/25    Expected End:  05/06/25

## 2025-04-01 ENCOUNTER — TREATMENT (OUTPATIENT)
Dept: PHYSICAL THERAPY | Facility: CLINIC | Age: 70
End: 2025-04-01
Payer: MEDICARE

## 2025-04-01 DIAGNOSIS — M25.511 ACUTE PAIN OF RIGHT SHOULDER: ICD-10-CM

## 2025-04-01 DIAGNOSIS — M25.811 CLICKING RIGHT SHOULDER: ICD-10-CM

## 2025-04-01 DIAGNOSIS — M79.621 PAIN IN RIGHT UPPER ARM: ICD-10-CM

## 2025-04-01 PROCEDURE — 97110 THERAPEUTIC EXERCISES: CPT | Mod: GP | Performed by: PHYSICAL THERAPIST

## 2025-04-01 PROCEDURE — 97140 MANUAL THERAPY 1/> REGIONS: CPT | Mod: GP | Performed by: PHYSICAL THERAPIST

## 2025-04-01 NOTE — PROGRESS NOTES
Physical Therapy    Physical Therapy Treatment    Patient Name: Kei Carlton  MRN: [id number]  Today's Date: 4/1/2025  Visit #2  Time Calculation  Start Time: 1232  Stop Time: 1313  Time Calculation (min): 41 min     PT Therapeutic Procedures Time Entry  Manual Therapy Time Entry: 10  Therapeutic Exercise Time Entry: 31        Payor: MEDICARE / Plan: MEDICARE PART A AND B / Product Type: *No Product type* /   Referred by:Perico Hinton  Current Problem  Problem List Items Addressed This Visit             ICD-10-CM    Pain in right upper arm M79.621    Clicking right shoulder M25.811    Acute pain of right shoulder M25.511        Subjective   Pt reports his shoulder has been a little better since he started to use it.  Since he injured it he wasn't using it   Pain: Better  Pt has been compliant with HEP Yes      Objective  No objective measures assessed this visit    Assessment:  Pt is progressing as expected towards goals. Pt is just beginning therapy.  Has some mild ER pain with PROM but otherwise able to complete exercises without discomfort  This session exercises were progressed (p) or added (NEW) as documented in the treatment section.  The pt required min to mod cues and demonstration to complete exercises with proper form.    Pt responded to all activities without adverse effects.    Pt continues to lack strength  necessary for the completion of baseline ADLs without pain.  Pt will benefit from further therapy to continue to progress towards meeting functional and impairment goals.    Plan:  Continue to progress treatment to improve strength, range of motion, joint mobility, pain, and flexibility impairments which are impacting patient's function.    Treatments:  Visit # 2  There ex:  UBE 5 min  Pulleys 2 min  Mid rows and pulldowns OTB x 20  Shoulder er and IR OTB x 20  Wall clocks 12 to 6:00 x 5  Shoulder flex, abd, and scap 1# x 20  Standing wands flex, abd and ext 1 min ea  Manual:  The patient was  edcuated about the risks and benefits of manual therapy.  Pt agrees to treatment.  Pt was given opportunity to stop if needed.  No adverse effects were noted.   PROM of shoulder in all planes  Goals:  Active       PT Problem       STG:Reduce pain at worst to 2-3/10 with all prior activity.        Start:  03/25/25    Expected End:  04/15/25            STG: Pt to sit with good posture approx 50-75% of the time.        Start:  03/25/25    Expected End:  04/15/25            Reduce pain at worst to 0/10 with all prior activity.        Start:  03/25/25    Expected End:  05/06/25            Increase shoulder AROM (in degrees) flex to 155, Abd 165, ER 40, IR 65 for lifting, ADL's, reaching and self care.        Start:  03/25/25    Expected End:  05/06/25            Pt to have increased shoulder and scapular strength to full MMT grade for improved ADL's, lifting and postural support.        Start:  03/25/25    Expected End:  05/06/25            Pt to decrease quick DASH score by 10-15% to display overall improved shoulder function.        Start:  03/25/25    Expected End:  05/06/25            Pt to be independent with a HEP for self management.        Start:  03/25/25    Expected End:  05/06/25

## 2025-04-07 ENCOUNTER — TREATMENT (OUTPATIENT)
Dept: PHYSICAL THERAPY | Facility: CLINIC | Age: 70
End: 2025-04-07
Payer: MEDICARE

## 2025-04-07 DIAGNOSIS — M25.511 ACUTE PAIN OF RIGHT SHOULDER: ICD-10-CM

## 2025-04-07 DIAGNOSIS — M79.621 PAIN IN RIGHT UPPER ARM: ICD-10-CM

## 2025-04-07 DIAGNOSIS — M25.811 CLICKING RIGHT SHOULDER: ICD-10-CM

## 2025-04-07 PROCEDURE — 97110 THERAPEUTIC EXERCISES: CPT | Mod: GP | Performed by: PHYSICAL THERAPIST

## 2025-04-07 PROCEDURE — 97140 MANUAL THERAPY 1/> REGIONS: CPT | Mod: GP | Performed by: PHYSICAL THERAPIST

## 2025-04-07 NOTE — PROGRESS NOTES
Physical Therapy    Physical Therapy Treatment    Patient Name: Kei Carlton  MRN: [id number]  Today's Date: 4/7/2025    Time Entry:   Time Calculation  Start Time: 1000  Stop Time: 1039  Time Calculation (min): 39 min     PT Therapeutic Procedures Time Entry  Manual Therapy Time Entry: 8  Therapeutic Exercise Time Entry: 30    Assessment:  Good tolerance to all exercises and everything in PT session. Improved flexion AROM and good tolerance to progressions. No adverse reactions. Patient will continue to benefit from skilled PT services.     Plan:  Continue PT as planned     Current Problem  1. Pain in right upper arm  Follow Up In Physical Therapy      2. Clicking right shoulder  Follow Up In Physical Therapy      3. Acute pain of right shoulder  Follow Up In Physical Therapy        Subjective    Patient reports that his right shoulder mobility is improving. He continues to have 6/10 right shoulder pain and hasn't noticed any improvements in pain yet. However, he does report that he feels better after exercises. He is ready for another PT session.     Objective   170 degrees shoulder flexion AROM  175 degrees post manual techniques for AROM     Treatments:  Visit # 3  Therapeutic Exercise:   UBE x 5 min  Pulleys x 2 min  Resisted rows BTB 3 x 10   B shoulder extension BTB 3 x 10   Shoulder ER and IR OTB 3 x 10  Ball on wall x 20 CW/CCW  Shoulder flex and scaption 2# x 20  Standing wands flexion x 10 AAROM  90-90 isometric walk outs IR/ER x 10 orange  B shoulder ER orange 2 x 10      Manual Therapy  Grade II-IV posterior/inferior mobilizations shoulder    OP EDUCATION:  ROM improvements, manual therapy, technique, expect soreness, continue HEP    Goals:  Active       PT Problem       STG:Reduce pain at worst to 2-3/10 with all prior activity.        Start:  03/25/25    Expected End:  04/15/25            STG: Pt to sit with good posture approx 50-75% of the time.        Start:  03/25/25    Expected End:  04/15/25             Reduce pain at worst to 0/10 with all prior activity.        Start:  03/25/25    Expected End:  05/06/25            Increase shoulder AROM (in degrees) flex to 155, Abd 165, ER 40, IR 65 for lifting, ADL's, reaching and self care.        Start:  03/25/25    Expected End:  05/06/25            Pt to have increased shoulder and scapular strength to full MMT grade for improved ADL's, lifting and postural support.        Start:  03/25/25    Expected End:  05/06/25            Pt to decrease quick DASH score by 10-15% to display overall improved shoulder function.        Start:  03/25/25    Expected End:  05/06/25            Pt to be independent with a HEP for self management.        Start:  03/25/25    Expected End:  05/06/25

## 2025-04-09 ENCOUNTER — HOSPITAL ENCOUNTER (OUTPATIENT)
Dept: RADIOLOGY | Facility: HOSPITAL | Age: 70
Discharge: HOME | End: 2025-04-09

## 2025-04-09 DIAGNOSIS — Z80.0 FAMILY HISTORY OF PANCREATIC CANCER: ICD-10-CM

## 2025-04-09 PROCEDURE — 6100000002 MR PANCREAS SCREENING SELF PAY

## 2025-04-15 ENCOUNTER — TELEPHONE (OUTPATIENT)
Dept: PRIMARY CARE | Facility: CLINIC | Age: 70
End: 2025-04-15
Payer: MEDICARE

## 2025-04-15 DIAGNOSIS — K86.2 PANCREATIC CYST (HHS-HCC): Primary | ICD-10-CM

## 2025-04-15 DIAGNOSIS — Z80.0 FAMILY HISTORY OF PANCREATIC CANCER: ICD-10-CM

## 2025-04-15 NOTE — TELEPHONE ENCOUNTER
Informed patient MD filled oxyCODONE-acetaminophen (PERCOCET)  MG per tablet yesterday.    Pt called requesting update on mri that he had, please advise

## 2025-04-16 ENCOUNTER — TREATMENT (OUTPATIENT)
Dept: PHYSICAL THERAPY | Facility: CLINIC | Age: 70
End: 2025-04-16
Payer: MEDICARE

## 2025-04-16 DIAGNOSIS — M25.511 ACUTE PAIN OF RIGHT SHOULDER: Primary | ICD-10-CM

## 2025-04-16 DIAGNOSIS — M79.621 PAIN IN RIGHT UPPER ARM: ICD-10-CM

## 2025-04-16 DIAGNOSIS — M25.811 CLICKING RIGHT SHOULDER: ICD-10-CM

## 2025-04-16 PROCEDURE — 97140 MANUAL THERAPY 1/> REGIONS: CPT | Mod: GP | Performed by: PHYSICAL THERAPIST

## 2025-04-16 PROCEDURE — 97110 THERAPEUTIC EXERCISES: CPT | Mod: GP | Performed by: PHYSICAL THERAPIST

## 2025-04-16 ASSESSMENT — PAIN SCALES - GENERAL: PAINLEVEL_OUTOF10: 0 - NO PAIN

## 2025-04-16 ASSESSMENT — PAIN - FUNCTIONAL ASSESSMENT: PAIN_FUNCTIONAL_ASSESSMENT: 0-10

## 2025-04-16 NOTE — PROGRESS NOTES
Physical Therapy    Physical Therapy Treatment    Patient Name: Kei Carlton  MRN: [id number]  Today's Date: 4/16/2025  Time Calculation  Start Time: 1130  Stop Time: 1215  Time Calculation (min): 45 min     PT Therapeutic Procedures Time Entry  Manual Therapy Time Entry: 10  Therapeutic Exercise Time Entry: 35                 Payor: MEDICARE / Plan: MEDICARE PART A AND B / Product Type: *No Product type* /     Reason for Referral: R shoulder Pain  Referred By: YONY Hinton  General Comment: Visit 4 of [place]    Current Problem    Problem List Items Addressed This Visit           ICD-10-CM    Pain in right upper arm M79.621    Clicking right shoulder M25.811    Acute pain of right shoulder - Primary M25.511      Subjective   Pt overall feels his shoulder motion is doing well but still getting soreness in the upper arm.   Compliance with HEP-yes    Precautions  Precautions  Precautions Comment: none    Pain  Pain Assessment: 0-10  0-10 (Numeric) Pain Score: 0 - No pain (3-4/10 at worst)  Pain Location: Shoulder  Pain Orientation: Right  Pain Radiating Towards: upper right arm    Objective   145* flexion, 165* abd R UE AROM     Treatments:  Therapeutic Exercise: x 35 min   UBE x 5 min  Pulleys flex, abd, IR x 2 min  Resisted rows BTB 3 x 10   B shoulder extension BTB 3 x 10   Shoulder ER pullouts GTB  3 x 10  Ball on wall x 20 CW/CCW  Shoulder flex, abd and scaption to 90* 2# x 20  Standing wands flexion x 10 AAROM    Manual Therapy x 10 min   Grade II-IV posterior/inferior mobilizations shoulder, Manual stretch     Informed consent given on manual treatment. Pt given opportunity to cease treatment at any time. Educated pt on expected soreness, possible ecchymosis. Pt voiced understanding  No adverse effects were noted post tx.      Assessment:   Pt overall has progressed well with his therapy thus far with improved ROM in the shoulder but still getting some pain with reaching overhead and soreness. Overall recommenced to  continue with stretching at home as tolerated,     Plan:   Continue to progress shoulder ROM into flexion and progress strength.     Goals:  Active       PT Problem       STG:Reduce pain at worst to 2-3/10 with all prior activity.        Start:  03/25/25    Expected End:  04/15/25            STG: Pt to sit with good posture approx 50-75% of the time.        Start:  03/25/25    Expected End:  04/15/25            Reduce pain at worst to 0/10 with all prior activity.        Start:  03/25/25    Expected End:  05/06/25            Increase shoulder AROM (in degrees) flex to 155, Abd 165, ER 40, IR 65 for lifting, ADL's, reaching and self care.        Start:  03/25/25    Expected End:  05/06/25            Pt to have increased shoulder and scapular strength to full MMT grade for improved ADL's, lifting and postural support.        Start:  03/25/25    Expected End:  05/06/25            Pt to decrease quick DASH score by 10-15% to display overall improved shoulder function.        Start:  03/25/25    Expected End:  05/06/25            Pt to be independent with a HEP for self management.        Start:  03/25/25    Expected End:  05/06/25

## 2025-04-23 ENCOUNTER — TREATMENT (OUTPATIENT)
Dept: PHYSICAL THERAPY | Facility: CLINIC | Age: 70
End: 2025-04-23
Payer: MEDICARE

## 2025-04-23 DIAGNOSIS — M79.621 PAIN IN RIGHT UPPER ARM: ICD-10-CM

## 2025-04-23 DIAGNOSIS — M25.811 CLICKING RIGHT SHOULDER: ICD-10-CM

## 2025-04-23 DIAGNOSIS — M25.511 ACUTE PAIN OF RIGHT SHOULDER: ICD-10-CM

## 2025-04-23 PROCEDURE — 97140 MANUAL THERAPY 1/> REGIONS: CPT | Mod: GP | Performed by: PHYSICAL THERAPIST

## 2025-04-23 PROCEDURE — 97110 THERAPEUTIC EXERCISES: CPT | Mod: GP | Performed by: PHYSICAL THERAPIST

## 2025-04-23 ASSESSMENT — PAIN SCALES - GENERAL: PAINLEVEL_OUTOF10: 0 - NO PAIN

## 2025-04-23 ASSESSMENT — PAIN - FUNCTIONAL ASSESSMENT: PAIN_FUNCTIONAL_ASSESSMENT: 0-10

## 2025-04-23 NOTE — PROGRESS NOTES
Physical Therapy    Physical Therapy Treatment    Patient Name: Kei Carlton  MRN: [id number]  Today's Date: 4/23/2025  Time Calculation  Start Time: 1045  Stop Time: 1130  Time Calculation (min): 45 min     PT Therapeutic Procedures Time Entry  Manual Therapy Time Entry: 10  Therapeutic Exercise Time Entry: 35                 Payor: MEDICARE / Plan: MEDICARE PART A AND B / Product Type: *No Product type* /     Reason for Referral: R shoulder Pain  Referred By: YONY Hinton  General Comment: Visit 5 of [place]    Current Problem    Problem List Items Addressed This Visit           ICD-10-CM    Pain in right upper arm M79.621    Clicking right shoulder M25.811    Acute pain of right shoulder M25.511      Subjective   Pt overall noted he feels that he has improved since last week with ROM and pain.    Compliance with HEP-yes    Precautions  Precautions  STEADI Fall Risk Score (The score of 4 or more indicates an increased risk of falling): no changes  Precautions Comment: none    Pain  Pain Assessment: 0-10  0-10 (Numeric) Pain Score: 0 - No pain (3-4/10 avg pain)  Pain Location: Shoulder  Pain Orientation: Right  Pain Radiating Towards: upper right arm    Objective   No measures today     Treatments:  Therapeutic Exercise: x 35 min   UBE x 5 min  Pulleys flex, abd, IR x 2 min  Resisted rows BTB 3 x 10   B shoulder extension BTB 3 x 10   Shoulder ER pullouts GTB  3 x 10  Ball on wall x 20 CW/CCW  Shoulder flex, abd and scaption to 90* 2# x 20  Mod prone over ball I's, Y's, T's 1# 2 x 10 ea  Supine punch 5# 2 x 10   Supine chest press 5# 2 x 10     Manual Therapy x 10 min   Grade II-IV posterior/inferior mobilizations shoulder, Manual stretch     Informed consent given on manual treatment. Pt given opportunity to cease treatment at any time. Educated pt on expected soreness, possible ecchymosis. Pt voiced understanding  No adverse effects were noted post tx.      Assessment:   Pt overall tolerated treatment well today and felt  improved with manual stretching today and increased motion, Progressed program today with shoulder strengthening in mod prone over ball and plan to update HEP with recheck next visit. .      Plan:   Recheck next visit     Goals:  Active       PT Problem       STG:Reduce pain at worst to 2-3/10 with all prior activity.        Start:  03/25/25    Expected End:  04/15/25            STG: Pt to sit with good posture approx 50-75% of the time.        Start:  03/25/25    Expected End:  04/15/25            Reduce pain at worst to 0/10 with all prior activity.        Start:  03/25/25    Expected End:  05/06/25            Increase shoulder AROM (in degrees) flex to 155, Abd 165, ER 40, IR 65 for lifting, ADL's, reaching and self care.        Start:  03/25/25    Expected End:  05/06/25            Pt to have increased shoulder and scapular strength to full MMT grade for improved ADL's, lifting and postural support.        Start:  03/25/25    Expected End:  05/06/25            Pt to decrease quick DASH score by 10-15% to display overall improved shoulder function.        Start:  03/25/25    Expected End:  05/06/25            Pt to be independent with a HEP for self management.        Start:  03/25/25    Expected End:  05/06/25

## 2025-04-24 NOTE — PROGRESS NOTES
Subjective   Mr. Carlton is a 70-year-old male who is referred by Dr. Perico Hinton for pancreatic cysts.     He has a family history of pancreatic cancer in his father and paternal grandfather.     For this reason, he had a self-pay MRCP on 4/17/25 that demonstrated a 0.5 cm cyst in the pancreatic tail and a second cyst measuring 0.5 cm in the pancreatic body without MPD dilatation. He was referred here to discuss surveillance.     He has no complaints today. He denies chronic abdominal pain, early satiety, poor appetite, jaundice or unintentional weight loss.     Medical and surgical history: HTN, HLD, BPH, solitary right kidney, s/p bilateral inguinal hernia repair, tonsillectomy with adenoidectomy, trigger finger, excision of a lipoma (neck).  Family history: Father had pancreatic cancer, diagnosed at age 80.  Paternal grandfather age 65.  Social history: One cigar a week. Drink most nights of the week (hard liquor vs a beer). No illicits.     Objective     There were no vitals taken for this visit.     Physical Exam  General: in no acute distress, comfortable   Eyes: no pallor or scleral icterus  Respiratory: even, unlabored  Gastrointestinal: non-distended, abdomen soft, non-tender, no masses   Musculoskeletal: normal gate, no deformities   Integumentary: no concerning lesions, no jaundice  Neurologic: no gross deficits  Psychiatric: cognition intact, mood appropriate    Assessment/Plan   Mr. Carlton is a 70-year-old male who is referred by Dr. Perico Hinton for pancreatic cysts.     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 I discussed that branch duct IPMNs represent potentially pre-malignant lesions and are managed based on the presence or absence of high risk stigmata or concerning features including cyst size, cyst growth over time, enhancing mural nodule or main duct dilatation. Management decisions are based on these features, as well as, personal medical history, family  history, presence or absence of symptoms and patient preference. I see no high risk or worrisome features. I recommend annual surveillance considering his family history.     With his family history, I do believe he qualifies for our CAPS5 study (for familial pancreatic cancer screening/surveillance). He is interested in participating. I will notify the team with plan for EUS next year. I have ordered a .         Keren Torres PA-C

## 2025-04-25 ENCOUNTER — OFFICE VISIT (OUTPATIENT)
Dept: SURGICAL ONCOLOGY | Facility: CLINIC | Age: 70
End: 2025-04-25
Payer: MEDICARE

## 2025-04-25 ENCOUNTER — LAB (OUTPATIENT)
Dept: LAB | Facility: HOSPITAL | Age: 70
End: 2025-04-25
Payer: MEDICARE

## 2025-04-25 VITALS
SYSTOLIC BLOOD PRESSURE: 123 MMHG | DIASTOLIC BLOOD PRESSURE: 70 MMHG | WEIGHT: 191.6 LBS | HEIGHT: 69 IN | TEMPERATURE: 98.7 F | HEART RATE: 79 BPM | BODY MASS INDEX: 28.38 KG/M2

## 2025-04-25 DIAGNOSIS — K86.2 PANCREATIC CYST (HHS-HCC): ICD-10-CM

## 2025-04-25 DIAGNOSIS — Z80.0 FAMILY HISTORY OF PANCREATIC CANCER: ICD-10-CM

## 2025-04-25 DIAGNOSIS — D37.8 NEOPLASM OF UNCERTAIN BEHAVIOR OF TAIL OF PANCREAS: Primary | ICD-10-CM

## 2025-04-25 DIAGNOSIS — D37.8 NEOPLASM OF UNCERTAIN BEHAVIOR OF OTHER SPECIFIED DIGESTIVE ORGANS: Primary | ICD-10-CM

## 2025-04-25 LAB — CANCER AG19-9 SERPL-ACNC: 6.67 U/ML

## 2025-04-25 PROCEDURE — 99204 OFFICE O/P NEW MOD 45 MIN: CPT | Performed by: PHYSICIAN ASSISTANT

## 2025-04-25 PROCEDURE — 3008F BODY MASS INDEX DOCD: CPT | Performed by: PHYSICIAN ASSISTANT

## 2025-04-25 PROCEDURE — 1159F MED LIST DOCD IN RCRD: CPT | Performed by: PHYSICIAN ASSISTANT

## 2025-04-25 PROCEDURE — 99214 OFFICE O/P EST MOD 30 MIN: CPT | Performed by: PHYSICIAN ASSISTANT

## 2025-04-25 PROCEDURE — 1126F AMNT PAIN NOTED NONE PRSNT: CPT | Performed by: PHYSICIAN ASSISTANT

## 2025-04-25 ASSESSMENT — PAIN SCALES - GENERAL: PAINLEVEL_OUTOF10: 0-NO PAIN

## 2025-04-30 ENCOUNTER — TREATMENT (OUTPATIENT)
Dept: PHYSICAL THERAPY | Facility: CLINIC | Age: 70
End: 2025-04-30
Payer: MEDICARE

## 2025-04-30 DIAGNOSIS — M79.621 PAIN IN RIGHT UPPER ARM: ICD-10-CM

## 2025-04-30 DIAGNOSIS — M25.511 ACUTE PAIN OF RIGHT SHOULDER: Primary | ICD-10-CM

## 2025-04-30 DIAGNOSIS — M25.811 CLICKING RIGHT SHOULDER: ICD-10-CM

## 2025-04-30 PROCEDURE — 97110 THERAPEUTIC EXERCISES: CPT | Mod: GP | Performed by: PHYSICAL THERAPIST

## 2025-04-30 PROCEDURE — 97140 MANUAL THERAPY 1/> REGIONS: CPT | Mod: GP | Performed by: PHYSICAL THERAPIST

## 2025-04-30 ASSESSMENT — PAIN SCALES - GENERAL: PAINLEVEL_OUTOF10: 0 - NO PAIN

## 2025-04-30 ASSESSMENT — PAIN - FUNCTIONAL ASSESSMENT: PAIN_FUNCTIONAL_ASSESSMENT: 0-10

## 2025-04-30 NOTE — PROGRESS NOTES
Physical Therapy    Physical Therapy Treatment/Re-check/Discharge     Patient Name: Kei Carlton  MRN: [id number]  Today's Date: 4/30/2025  Time Calculation  Start Time: 1045  Stop Time: 1130  Time Calculation (min): 45 min     PT Therapeutic Procedures Time Entry  Manual Therapy Time Entry: 10  Therapeutic Exercise Time Entry: 35                 Payor: MEDICARE / Plan: MEDICARE PART A AND B / Product Type: *No Product type* /     Reason for Referral: R shoulder Pain  Referred By: YONY Hinton  General Comment: Visit 6 of [place]    Current Problem    Problem List Items Addressed This Visit           ICD-10-CM    Pain in right upper arm M79.621    Clicking right shoulder M25.811    Acute pain of right shoulder - Primary M25.511      Subjective   Pt notes he feels improved motion but pain is still present with certain motions of the R shoulder.    Compliance with HEP-yes    Precautions  Precautions  STEADI Fall Risk Score (The score of 4 or more indicates an increased risk of falling): no changes  Precautions Comment: none    Pain  Pain Assessment: 0-10  0-10 (Numeric) Pain Score: 0 - No pain (5/10 at worst with certain movements)  Pain Location: Shoulder  Pain Orientation: Right  Pain Radiating Towards: upper right arm    Objective   Upper Extremity Strength:  MMT 5/5 max  RIGHT LEFT   Shoulder Flexion 4+ Pain 5   Shoulder Abduction 4+ pain 5   Shoulder ER 4 pain 5   Shoulder IR 4+ 5   Elbow Flexion 5 5   Elbow Extension 5 5     Upper Extremity ROM: WNL unless documented below:  ROM in Degrees RIGHT LEFT   Shoulder Flexion 153 155   Shoulder Abduction 172 170   Shoulder ER 68 70   Shoulder IR 55 WNL   Elbow Flexion WNL WNL   Elbow Extension WNL WNL     Treatments:  Therapeutic Exercise: x 35 min   UBE x 5 min  Pulleys flex, abd, IR x 2 min  Resisted rows BTB 3 x 10   B shoulder extension BTB 3 x 10   Shoulder ER pullouts GTB  3 x 10  Ball on wall x 20 CW/CCW  Shoulder flex, abd and scaption to 90* 2# x 20  Mod prone over  ball I's, Y's, T's 1# 2 x 10 ea  Supine punch 5# 2 x 10   Supine chest press 5# 2 x 10     Manual Therapy x 10 min   Grade II-IV posterior/inferior mobilizations shoulder, Manual stretch     Informed consent given on manual treatment. Pt given opportunity to cease treatment at any time. Educated pt on expected soreness, possible ecchymosis. Pt voiced understanding  No adverse effects were noted post tx.      Current HEP:  Access Code: [email or web address]  URL: [email or web address]  Date: 04/30/2025  Prepared by: KIMBERLEY Wu    Exercises  - Standing Single Arm Shoulder Flexion Stretch on Wall (Mirrored)  - 1 x daily - 7 x weekly - 1 sets - 10 reps - 5 sec hold  - Seated Shoulder Flexion Towel Slide at Table Top  - 1 x daily - 7 x weekly - 1 sets - 10 reps - 5 sec  hold  - Seated Shoulder External Rotation PROM on Table (Mirrored)  - 1 x daily - 7 x weekly - 1 sets - 10 reps - 5 sec hold  - Seated Scapular Retraction  - 1 x daily - 7 x weekly - 2 sets - 10 reps  - Standing Shoulder Row with Anchored Resistance  - 1 x daily - 7 x weekly - 2 sets - 10 reps  - Shoulder extension with resistance - Neutral  - 1 x daily - 7 x weekly - 2 sets - 10 reps  - Standing Shoulder Flexion to 90 Degrees with Dumbbells  - 1 x daily - 7 x weekly - 2 sets - 10 reps  - Shoulder Abduction with Dumbbells - Thumbs Up  - 1 x daily - 7 x weekly - 2 sets - 10 reps  - Scaption with Dumbbells  - 1 x daily - 7 x weekly - 2 sets - 10 reps  - Supine Chest Press with Dumbbells  - 1 x daily - 7 x weekly - 2 sets - 10 reps  - Supine Scapular Protraction in Flexion with Dumbbells  - 1 x daily - 7 x weekly - 2 sets - 10 reps  - Standing Sleeper Stretch at Wall (Mirrored)  - 1 x daily - 7 x weekly - 1 sets - 10 reps - 5 sec hold    Assessment:   Pt overall with improved ROM and strength in the R shoulder but still having pain with certain movements in the shoulder. Overall has met approx 75% of goals at this time and is recommended to hold  from therapy and follow up with his PCP regarding his pain. Pt will continue with his update HEP and has a fair prognosis going forward.     Plan:   Hold and discharge from PT to HEP. Pt to follow up with PCP. Pt agreeable with plan.     Goals:  Resolved       PT Problem       STG:Reduce pain at worst to 2-3/10 with all prior activity.  (Not met)       Start:  03/25/25    Expected End:  04/15/25    Resolved:  04/30/25         STG: Pt to sit with good posture approx 50-75% of the time.  (Met)       Start:  03/25/25    Expected End:  04/15/25    Resolved:  04/30/25         Reduce pain at worst to 0/10 with all prior activity.  (Not met)       Start:  03/25/25    Expected End:  05/06/25    Resolved:  04/30/25         Increase shoulder AROM (in degrees) flex to 155, Abd 165, ER 40, IR 65 for lifting, ADL's, reaching and self care.  (Met)       Start:  03/25/25    Expected End:  05/06/25    Resolved:  04/30/25         Pt to have increased shoulder and scapular strength to full MMT grade for improved ADL's, lifting and postural support.  (Adequate for Discharge)       Start:  03/25/25    Expected End:  05/06/25            Pt to decrease quick DASH score by 10-15% to display overall improved shoulder function.  (Adequate for Discharge)       Start:  03/25/25    Expected End:  05/06/25            Pt to be independent with a HEP for self management.  (Met)       Start:  03/25/25    Expected End:  05/06/25    Resolved:  04/30/25

## 2025-05-19 ENCOUNTER — APPOINTMENT (OUTPATIENT)
Dept: SURGICAL ONCOLOGY | Facility: CLINIC | Age: 70
End: 2025-05-19
Payer: MEDICARE

## 2025-06-16 ENCOUNTER — APPOINTMENT (OUTPATIENT)
Dept: DERMATOLOGY | Facility: CLINIC | Age: 70
End: 2025-06-16
Payer: MEDICARE

## 2025-06-16 DIAGNOSIS — L30.4 INTERTRIGO: ICD-10-CM

## 2025-06-16 DIAGNOSIS — L82.1 SEBORRHEIC KERATOSIS: Primary | ICD-10-CM

## 2025-06-16 DIAGNOSIS — Z12.83 ENCOUNTER FOR SCREENING FOR MALIGNANT NEOPLASM OF SKIN: ICD-10-CM

## 2025-06-16 DIAGNOSIS — L81.4 LENTIGO: ICD-10-CM

## 2025-06-16 DIAGNOSIS — D18.01 HEMANGIOMA OF SKIN: ICD-10-CM

## 2025-06-16 PROCEDURE — 1159F MED LIST DOCD IN RCRD: CPT | Performed by: NURSE PRACTITIONER

## 2025-06-16 PROCEDURE — 99214 OFFICE O/P EST MOD 30 MIN: CPT | Performed by: NURSE PRACTITIONER

## 2025-06-16 PROCEDURE — 1160F RVW MEDS BY RX/DR IN RCRD: CPT | Performed by: NURSE PRACTITIONER

## 2025-06-16 RX ORDER — HYDROCORTISONE 25 MG/G
CREAM TOPICAL
Qty: 60 G | Refills: 3 | Status: SHIPPED | OUTPATIENT
Start: 2025-06-16

## 2025-06-16 ASSESSMENT — DERMATOLOGY QUALITY OF LIFE (QOL) ASSESSMENT
RATE HOW BOTHERED YOU ARE BY SYMPTOMS OF YOUR SKIN PROBLEM (EG, ITCHING, STINGING BURNING, HURTING OR SKIN IRRITATION): 5
WHAT SINGLE SKIN CONDITION LISTED BELOW IS THE PATIENT ANSWERING THE QUALITY-OF-LIFE ASSESSMENT QUESTIONS ABOUT: NONE OF THE ABOVE
RATE HOW EMOTIONALLY BOTHERED YOU ARE BY YOUR SKIN PROBLEM (FOR EXAMPLE, WORRY, EMBARRASSMENT, FRUSTRATION): 0 - NEVER BOTHERED
RATE HOW BOTHERED YOU ARE BY EFFECTS OF YOUR SKIN PROBLEMS ON YOUR ACTIVITIES (EG, GOING OUT, ACCOMPLISHING WHAT YOU WANT, WORK ACTIVITIES OR YOUR RELATIONSHIPS WITH OTHERS): 0 - NEVER BOTHERED
RATE HOW BOTHERED YOU ARE BY SYMPTOMS OF YOUR SKIN PROBLEM (EG, ITCHING, STINGING BURNING, HURTING OR SKIN IRRITATION): 5
WHAT SINGLE SKIN CONDITION LISTED BELOW IS THE PATIENT ANSWERING THE QUALITY-OF-LIFE ASSESSMENT QUESTIONS ABOUT: NONE OF THE ABOVE
RATE HOW EMOTIONALLY BOTHERED YOU ARE BY YOUR SKIN PROBLEM (FOR EXAMPLE, WORRY, EMBARRASSMENT, FRUSTRATION): 0 - NEVER BOTHERED
RATE HOW BOTHERED YOU ARE BY EFFECTS OF YOUR SKIN PROBLEMS ON YOUR ACTIVITIES (EG, GOING OUT, ACCOMPLISHING WHAT YOU WANT, WORK ACTIVITIES OR YOUR RELATIONSHIPS WITH OTHERS): 0 - NEVER BOTHERED

## 2025-06-16 ASSESSMENT — PATIENT GLOBAL ASSESSMENT (PGA): WHAT IS THE PGA: PATIENT GLOBAL ASSESSMENT:  1 - CLEAR

## 2025-06-16 NOTE — PROGRESS NOTES
"Subjective     Kei Carlton \"Marv\" is a 70 y.o. male who presents for the following: Skin Check (Presents for FSE: no history of skin cancer. Denies new lesions of concern. ).     Intake Questions  Do you have any new or changing Lesions?: (Patient-Rptd) (P) No  Are you an organ transplant recipient?: (Patient-Rptd) (P) No  Have you had or do you have a Staph Infection?: (Patient-Rptd) (P) No  Have you had or do you have Vacular Disease?: (Patient-Rptd) (P) No  Do you use sunscreen?: (Patient-Rptd) (P) None  Do you use a tanning bed?: (Patient-Rptd) (P) No    Review of Systems:  No other skin or systemic complaints other than what is documented elsewhere in the note.    The following portions of the chart were reviewed this encounter and updated as appropriate:         Skin Cancer History  Biopsy Log Book  No skin cancers from Specimen Tracking.    Additional History      Specialty Problems          Dermatology Problems    Actinic keratosis    Hemangioma of skin and subcutaneous tissue    Other seborrheic keratosis    Inflamed seborrheic keratosis    Other benign neoplasm of skin of left lower limb, including hip    Other melanin hyperpigmentation    Other skin changes due to chronic exposure to nonionizing radiation    Melanocytic nevi of trunk    Skin lesion    Punctate palmoplantar keratoderma     Past Medical History:  Kei Calrton \"Marv\"  has a past medical history of Abnormal levels of other serum enzymes (02/09/2017), Allergic, Other conditions influencing health status (09/07/2021), Pain in left finger(s) (08/27/2018), Palpitations (08/12/2016), Personal history of other diseases of the respiratory system (09/07/2021), Personal history of other specified conditions (07/13/2018), Personal history of other specified conditions (09/07/2021), Personal history of other specified conditions (01/27/2015), and Personal history of other specified conditions (01/27/2015).    Past Surgical History:  Kei SOL Carlton " "\"Raj"  has a past surgical history that includes Tonsillectomy (01/31/2014); Hernia repair (01/31/2014); Colonoscopy (09/04/2020); Adenoidectomy (1959); and Burgin tooth extraction (1990's).    Family History:  Patient family history includes Cancer in his father and paternal grandfather; Heart disease in his mother.    Social History:  Kei Carlton \"Marv\"  reports that he has been smoking cigars. He has never used smokeless tobacco. He reports current alcohol use of about 7.0 standard drinks of alcohol per week. He reports that he does not use drugs.    Allergies:  Clindamycin    Current Medications / CAM's:  Current Medications[1]     Objective   Well appearing patient in no apparent distress; mood and affect are within normal limits.    A full examination was performed including scalp, head, eyes, ears, nose, lips, neck, chest, axillae, abdomen, back, buttocks, bilateral upper extremities, bilateral lower extremities, hands, feet, fingers, toes, fingernails, and toenails. All findings within normal limits unless otherwise noted below.    Assessment/Plan   Skin Exam  1. SEBORRHEIC KERATOSIS  Generalized  Stuck on verrucous, tan-brown papules and plaques.    Although Seborrheic Keratoses can be troublesome and unsightly, they are entirely benign.  Removal of Seborrheic Keratoses is considered a cosmetic procedure. Removal is typically performed using liquid nitrogen cryotherapy.  Treatment of current lesions does not prevent the development of new Seborrheic Keratoses in the future.  2. ENCOUNTER FOR SCREENING FOR MALIGNANT NEOPLASM OF SKIN  Generalized  Scattered benign lesions  - Protective measures, such as avoiding skin exposure to sunlight during peak sun hours (10 AM to 3 PM), wearing protective clothing, and applying high-SPF sunscreen, are essential for reducing exposure to harmful ultraviolet (UV) light.  - Monthly self-examination of the skin is helpful to detect new lesions or changes in existing " lesions.  - Discussed signs and symptoms of sun-related skin cancers.   - Make sure your moles are not signs of skin cancer (melanoma). Remember the ABCDEs of melanoma lesions:  A - Asymmetry: One half of the lesion does not mirror the other half.  B - Border: The borders are irregular or vague (indistinct).  C - Color: More than one color may be noted within the mole.  D - Diameter: Size greater than 6 mm (roughly the size of a pencil eraser) may be concerning.  E - Evolving: Notable changes in the lesion over time are suspicious signs for skin cancer.  3. HEMANGIOMA OF SKIN  Generalized  Violaceous/red papule with maroon lagoons   - A cherry hemangioma is a small macule (small, flat, smooth area) or papule (small, solid bump) formed from an overgrowth of tiny blood vessels in the skin. Cherry hemangiomas are characteristically red or purplish in color. They often first appear in middle adulthood and usually increase in number with age. Cherry hemangiomas are noncancerous (benign) and are common in adults.  - Lesions are benign, reassured patient.   4. LENTIGO  Generalized  Scattered tan macules in sun-exposed areas.  A solar lentigo (plural, solar lentigines), sometimes called an age spot or liver spot, is a brown macule (small, flat, smooth area of skin) caused by chronic sun or artificial ultraviolet (UV) light exposure. There may be just one lentigo or there may be multiple. This type of lentigo is different from lentigo simplex (discussed separately) because it is caused by exposure to UV light. Solar lentigines are benign, but they do indicate excessive sun exposure, a risk factor for the development of skin cancer.  Lesions are benign, no treatment needed.   5. INTERTRIGO  Inguinal folds  Erythema of skin folds, no satellite papules  - Intertrigo is a rash that results from irritation of the skin in body folds (eg, armpits, under the breasts, belly, buttocks, groin, and sometimes between fingers or toes).  Intertrigo can be worsened by any conditions causing increased heat, wetness, and friction. Areas affected by intertrigo may also become infected with yeast or bacteria.  - The goals of treatment are aimed at eliminating or reducing friction, moisture, and heat in skin folds.  Gently cleanse the affected areas daily with mild soap (eg, Nature by Canus, Vanicream bar soap) or soap substitutes (eg, Cetaphil).  Dry the areas well. Additionally, wearing absorbent cotton or fabric may help (as long as fabrics are changed if they become damp). Mild antiperspirants and non-talc drying powders may help, but these can cause further irritation in some individuals.  Barrier creams, such as zinc oxide paste (eg, Desitin, Triple Paste, Vusion), may be helpful for individuals who wear diapers or have problems with incontinence.  If overweight, weight loss is recommended.  For persistent irritation, 2.5% hydrocortisone daily may help. Discontinue if the inflammation is no better after 2 weeks of use. Adding a topical antifungal to the hydrocortisone, such as clotrimazole cream, may help if yeast infection is suspected. Apply the barrier paste (if used) after application of these creams.     Follow up in 12 months. Please call me if there are any changes or development of concerning symptoms (lesion/skin condition is changing, bleeding, enlarging, or worsening).           [1]   Current Outpatient Medications:     ammonium lactate (Amlactin) 12 % cream, Apply topically daily to dry skin., Disp: 140 g, Rfl: 1    aspirin 81 mg EC tablet, Take 1 tablet (81 mg) by mouth once daily., Disp: , Rfl:     atorvastatin (Lipitor) 20 mg tablet, TAKE 1 TABLET BY MOUTH EVERY DAY, Disp: 90 tablet, Rfl: 3    finasteride (Proscar) 5 mg tablet, Take 1 tablet (5 mg) by mouth once daily., Disp: , Rfl:     melatonin 5 mg tablet, Take by mouth., Disp: , Rfl:     multivitamin tablet, Take 1 tablet by mouth once daily., Disp: , Rfl:     sertraline (Zoloft)  50 mg tablet, Take 1 tablet (50 mg) by mouth once daily., Disp: 90 tablet, Rfl: 1    tadalafil (Cialis) 20 mg tablet, Take by mouth., Disp: , Rfl:     tadalafil (Cialis) 5 mg tablet, Take 1 tablet (5 mg) by mouth once daily., Disp: , Rfl:     tamsulosin (Flomax) 0.4 mg 24 hr capsule, Take 1 capsule (0.4 mg) by mouth 2 times a day., Disp: , Rfl:     traZODone (Desyrel) 150 mg tablet, TAKE 1 TABLET (150 MG) BY MOUTH ONCE DAILY AT BEDTIME., Disp: 90 tablet, Rfl: 1

## 2025-06-16 NOTE — Clinical Note
- Intertrigo is a rash that results from irritation of the skin in body folds (eg, armpits, under the breasts, belly, buttocks, groin, and sometimes between fingers or toes). Intertrigo can be worsened by any conditions causing increased heat, wetness, and friction. Areas affected by intertrigo may also become infected with yeast or bacteria.  - The goals of treatment are aimed at eliminating or reducing friction, moisture, and heat in skin folds.  Gently cleanse the affected areas daily with mild soap (eg, Nature by Canus, Vanicream bar soap) or soap substitutes (eg, Cetaphil).  Dry the areas well. Additionally, wearing absorbent cotton or fabric may help (as long as fabrics are changed if they become damp). Mild antiperspirants and non-talc drying powders may help, but these can cause further irritation in some individuals.  Barrier creams, such as zinc oxide paste (eg, Desitin, Triple Paste, Vusion), may be helpful for individuals who wear diapers or have problems with incontinence.  If overweight, weight loss is recommended.  For persistent irritation, 2.5% hydrocortisone daily may help. Discontinue if the inflammation is no better after 2 weeks of use. Adding a topical antifungal to the hydrocortisone, such as clotrimazole cream, may help if yeast infection is suspected. Apply the barrier paste (if used) after application of these creams.

## 2025-06-25 LAB
ALT SERPL-CCNC: 28 U/L (ref 9–46)
BUN SERPL-MCNC: 11 MG/DL (ref 7–25)
BUN/CREAT SERPL: ABNORMAL (CALC) (ref 6–22)
CALCIUM SERPL-MCNC: 9.4 MG/DL (ref 8.6–10.3)
CHLORIDE SERPL-SCNC: 102 MMOL/L (ref 98–110)
CHOLEST SERPL-MCNC: 151 MG/DL
CHOLEST/HDLC SERPL: 2.7 (CALC)
CO2 SERPL-SCNC: 28 MMOL/L (ref 20–32)
CREAT SERPL-MCNC: 0.8 MG/DL (ref 0.7–1.28)
EGFRCR SERPLBLD CKD-EPI 2021: 95 ML/MIN/1.73M2
GLUCOSE SERPL-MCNC: 103 MG/DL (ref 65–99)
HBA1C MFR BLD: 5.7 %
HDLC SERPL-MCNC: 55 MG/DL
LDLC SERPL CALC-MCNC: 81 MG/DL (CALC)
NONHDLC SERPL-MCNC: 96 MG/DL (CALC)
POTASSIUM SERPL-SCNC: 4.5 MMOL/L (ref 3.5–5.3)
SODIUM SERPL-SCNC: 136 MMOL/L (ref 135–146)
TRIGL SERPL-MCNC: 70 MG/DL

## 2025-07-23 ENCOUNTER — APPOINTMENT (OUTPATIENT)
Dept: PRIMARY CARE | Facility: CLINIC | Age: 70
End: 2025-07-23
Payer: MEDICARE

## 2025-07-23 VITALS
OXYGEN SATURATION: 96 % | SYSTOLIC BLOOD PRESSURE: 120 MMHG | BODY MASS INDEX: 27.4 KG/M2 | HEART RATE: 71 BPM | HEIGHT: 69 IN | DIASTOLIC BLOOD PRESSURE: 70 MMHG | WEIGHT: 185 LBS

## 2025-07-23 DIAGNOSIS — E78.00 HYPERCHOLESTEROLEMIA: Primary | ICD-10-CM

## 2025-07-23 DIAGNOSIS — F40.10 SHY BLADDER SYNDROME: ICD-10-CM

## 2025-07-23 DIAGNOSIS — R35.1 BENIGN PROSTATIC HYPERPLASIA WITH NOCTURIA: ICD-10-CM

## 2025-07-23 DIAGNOSIS — M25.511 ACUTE PAIN OF RIGHT SHOULDER: ICD-10-CM

## 2025-07-23 DIAGNOSIS — F51.04 CHRONIC INSOMNIA: ICD-10-CM

## 2025-07-23 DIAGNOSIS — R73.03 PREDIABETES: ICD-10-CM

## 2025-07-23 DIAGNOSIS — N40.1 BENIGN PROSTATIC HYPERPLASIA WITH NOCTURIA: ICD-10-CM

## 2025-07-23 PROCEDURE — 3008F BODY MASS INDEX DOCD: CPT | Performed by: INTERNAL MEDICINE

## 2025-07-23 PROCEDURE — 1160F RVW MEDS BY RX/DR IN RCRD: CPT | Performed by: INTERNAL MEDICINE

## 2025-07-23 PROCEDURE — 1159F MED LIST DOCD IN RCRD: CPT | Performed by: INTERNAL MEDICINE

## 2025-07-23 PROCEDURE — 99214 OFFICE O/P EST MOD 30 MIN: CPT | Performed by: INTERNAL MEDICINE

## 2025-07-23 RX ORDER — TAMSULOSIN HYDROCHLORIDE 0.4 MG/1
0.4 CAPSULE ORAL DAILY
Start: 2025-07-23

## 2025-07-23 RX ORDER — SERTRALINE HYDROCHLORIDE 50 MG/1
50 TABLET, FILM COATED ORAL DAILY
Qty: 90 TABLET | Refills: 1 | Status: SHIPPED | OUTPATIENT
Start: 2025-07-23

## 2025-07-23 ASSESSMENT — ENCOUNTER SYMPTOMS
ARTHRALGIAS: 0
CHILLS: 0
HEADACHES: 0
MYALGIAS: 0
VERTIGO: 0
FATIGUE: 0
ANOREXIA: 0
SHORTNESS OF BREATH: 0
NAUSEA: 0
WEAKNESS: 0
VOMITING: 0
INSOMNIA: 1

## 2025-07-23 NOTE — PROGRESS NOTES
"Subjective   Patient ID: Marv Carlton is a 70 y.o. male who presents for Follow-up chronic medical problems.    Completed PT for right shoulder.  Still has pain with ROM.  Unable to lift w/o pain.  Xrays negative.  Discussed ortho consult.  Meds and labs reviewed.    Hyperlipidemia  This is a chronic problem. The current episode started more than 1 year ago. The problem is controlled. Recent lipid tests were reviewed and are low. Pertinent negatives include no chest pain, myalgias or shortness of breath. Current antihyperlipidemic treatment includes statins. The current treatment provides significant improvement of lipids. There are no compliance problems.    Insomnia  This is a chronic problem. The current episode started more than 1 year ago. The problem occurs intermittently. The problem has been waxing and waning. Pertinent negatives include no anorexia, arthralgias, chest pain, chills, fatigue, headaches, myalgias, nausea, vertigo, vomiting or weakness. The treatment provided moderate relief.        Review of Systems   Constitutional:  Negative for chills and fatigue.   Respiratory:  Negative for shortness of breath.    Cardiovascular:  Negative for chest pain.   Gastrointestinal:  Negative for anorexia, nausea and vomiting.   Musculoskeletal:  Negative for arthralgias and myalgias.        Right shoulder pain.   Neurological:  Negative for vertigo, weakness and headaches.   Psychiatric/Behavioral:  The patient has insomnia.        Objective   /70 (BP Location: Right arm, Patient Position: Sitting)   Pulse 71   Ht 1.753 m (5' 9\")   Wt 83.9 kg (185 lb)   SpO2 96%   BMI 27.32 kg/m²     Physical Exam  Constitutional:       Appearance: Normal appearance.   Neck:      Vascular: No carotid bruit.     Cardiovascular:      Rate and Rhythm: Normal rate and regular rhythm.      Pulses: Normal pulses.      Heart sounds: Normal heart sounds.   Pulmonary:      Effort: Pulmonary effort is normal.      Breath " sounds: Normal breath sounds.   Abdominal:      General: Abdomen is flat.      Palpations: Abdomen is soft.      Tenderness: There is no abdominal tenderness.     Neurological:      General: No focal deficit present.      Mental Status: He is alert.     Psychiatric:         Mood and Affect: Mood normal.         Behavior: Behavior normal.         Thought Content: Thought content normal.         Judgment: Judgment normal.         Assessment/Plan   Problem List Items Addressed This Visit           ICD-10-CM    BPH (benign prostatic hyperplasia) N40.0    Condition improved with tamsulosin, proscar and cialis.  Followed with urology.         Relevant Medications    tamsulosin (Flomax) 0.4 mg 24 hr capsule    Chronic insomnia F51.04    Condition improved with trazodone and melatonin.         Hypercholesterolemia - Primary E78.00    Lipids, LDL at goal with statin.  Recommend low fat/cholesterol diet.           Relevant Orders    CBC and Auto Differential    Comprehensive metabolic panel    Lipid Panel    TSH with reflex to Free T4 if abnormal    Prediabetes R73.03    A1c 5.7 with diet.         Relevant Orders    CBC and Auto Differential    Comprehensive metabolic panel    Hemoglobin A1C    Shy bladder syndrome F40.10    Condition aron with sertraline.           Relevant Medications    sertraline (Zoloft) 50 mg tablet    Acute pain of right shoulder M25.511    Rec ortho for shoulder pain.         Relevant Orders    Referral to Orthopedics and Sports Medicine

## 2026-01-27 ENCOUNTER — APPOINTMENT (OUTPATIENT)
Dept: PRIMARY CARE | Facility: CLINIC | Age: 71
End: 2026-01-27
Payer: MEDICARE

## 2026-06-18 ENCOUNTER — APPOINTMENT (OUTPATIENT)
Dept: DERMATOLOGY | Facility: CLINIC | Age: 71
End: 2026-06-18
Payer: MEDICARE